# Patient Record
Sex: FEMALE | Race: BLACK OR AFRICAN AMERICAN | Employment: UNEMPLOYED | URBAN - METROPOLITAN AREA
[De-identification: names, ages, dates, MRNs, and addresses within clinical notes are randomized per-mention and may not be internally consistent; named-entity substitution may affect disease eponyms.]

---

## 2018-01-26 LAB
HBV SURFACE AG SERPL QL IA: NONREACTIVE
HIV 1+2 AB+HIV1 P24 AG SERPL QL IA: NONREACTIVE
RUBELLA ANTIBODY IGG QUANTITATIVE: NORMAL IU/ML
T PALLIDUM IGG SER QL: NON REACTIVE

## 2018-04-03 ENCOUNTER — HOSPITAL ENCOUNTER (EMERGENCY)
Facility: CLINIC | Age: 28
Discharge: HOME | End: 2018-04-03
Attending: EMERGENCY MEDICINE | Admitting: EMERGENCY MEDICINE
Payer: COMMERCIAL

## 2018-04-03 VITALS
BODY MASS INDEX: 22.18 KG/M2 | SYSTOLIC BLOOD PRESSURE: 93 MMHG | WEIGHT: 138 LBS | HEIGHT: 66 IN | HEART RATE: 93 BPM | DIASTOLIC BLOOD PRESSURE: 65 MMHG | RESPIRATION RATE: 18 BRPM | OXYGEN SATURATION: 100 % | TEMPERATURE: 97 F

## 2018-04-03 DIAGNOSIS — K52.9 GASTROENTERITIS: ICD-10-CM

## 2018-04-03 LAB
ALBUMIN UR-MCNC: NEGATIVE MG/DL
ANION GAP SERPL CALCULATED.3IONS-SCNC: 5 MMOL/L (ref 3–14)
APPEARANCE UR: ABNORMAL
BILIRUB UR QL STRIP: NEGATIVE
BUN SERPL-MCNC: 7 MG/DL (ref 7–30)
CALCIUM SERPL-MCNC: 8.1 MG/DL (ref 8.5–10.1)
CHLORIDE SERPL-SCNC: 105 MMOL/L (ref 94–109)
CO2 SERPL-SCNC: 25 MMOL/L (ref 20–32)
COLOR UR AUTO: ABNORMAL
CREAT SERPL-MCNC: 0.45 MG/DL (ref 0.52–1.04)
GFR SERPL CREATININE-BSD FRML MDRD: >90 ML/MIN/1.7M2
GLUCOSE SERPL-MCNC: 73 MG/DL (ref 70–99)
GLUCOSE UR STRIP-MCNC: NEGATIVE MG/DL
HGB UR QL STRIP: NEGATIVE
KETONES UR STRIP-MCNC: 20 MG/DL
LEUKOCYTE ESTERASE UR QL STRIP: NEGATIVE
MUCOUS THREADS #/AREA URNS LPF: PRESENT /LPF
NITRATE UR QL: NEGATIVE
PH UR STRIP: 7 PH (ref 5–7)
POTASSIUM SERPL-SCNC: 3.4 MMOL/L (ref 3.4–5.3)
RBC #/AREA URNS AUTO: <1 /HPF (ref 0–2)
SODIUM SERPL-SCNC: 135 MMOL/L (ref 133–144)
SOURCE: ABNORMAL
SP GR UR STRIP: 1.01 (ref 1–1.03)
UROBILINOGEN UR STRIP-MCNC: 0 MG/DL (ref 0–2)
WBC #/AREA URNS AUTO: 1 /HPF (ref 0–5)

## 2018-04-03 PROCEDURE — 81001 URINALYSIS AUTO W/SCOPE: CPT | Performed by: EMERGENCY MEDICINE

## 2018-04-03 PROCEDURE — 80048 BASIC METABOLIC PNL TOTAL CA: CPT | Performed by: EMERGENCY MEDICINE

## 2018-04-03 PROCEDURE — 99284 EMERGENCY DEPT VISIT MOD MDM: CPT | Mod: 25

## 2018-04-03 PROCEDURE — 25000128 H RX IP 250 OP 636: Performed by: EMERGENCY MEDICINE

## 2018-04-03 PROCEDURE — 96361 HYDRATE IV INFUSION ADD-ON: CPT

## 2018-04-03 PROCEDURE — 96374 THER/PROPH/DIAG INJ IV PUSH: CPT

## 2018-04-03 RX ORDER — METOCLOPRAMIDE HYDROCHLORIDE 5 MG/ML
5 INJECTION INTRAMUSCULAR; INTRAVENOUS ONCE
Status: COMPLETED | OUTPATIENT
Start: 2018-04-03 | End: 2018-04-03

## 2018-04-03 RX ORDER — METOCLOPRAMIDE 5 MG/1
5 TABLET ORAL 3 TIMES DAILY PRN
Qty: 10 TABLET | Refills: 0 | Status: ON HOLD | OUTPATIENT
Start: 2018-04-03 | End: 2018-06-16

## 2018-04-03 RX ADMIN — METOCLOPRAMIDE 5 MG: 5 INJECTION, SOLUTION INTRAMUSCULAR; INTRAVENOUS at 17:47

## 2018-04-03 RX ADMIN — SODIUM CHLORIDE, POTASSIUM CHLORIDE, SODIUM LACTATE AND CALCIUM CHLORIDE 1000 ML: 600; 310; 30; 20 INJECTION, SOLUTION INTRAVENOUS at 17:48

## 2018-04-03 ASSESSMENT — ENCOUNTER SYMPTOMS
DYSURIA: 0
DIZZINESS: 1
DIARRHEA: 1
ABDOMINAL PAIN: 0
VOMITING: 1
NAUSEA: 1

## 2018-04-03 NOTE — ED NOTES
Patient presents with complaints of nausea, vomiting, and diarrhea. Patient states that vomiting started yesterday and diarrhea started today. Patient is currently about 17 weeks pregnant. Patient states she is having slight cramping but denies any discharge, ABC intact without need for intervention.

## 2018-04-03 NOTE — ED AVS SNAPSHOT
[facility] Emergency Department    [address]    [facility] [address]    Phone:  [phone]    Fax:  [phone]                                       Carmen Rousseau   MRN: [id number]    Department:  [facility] Emergency Department   Date of Visit:  4/3/2018           After Visit Summary Signature Page     I have received my discharge instructions, and my questions have been answered. I have discussed any challenges I see with this plan with the nurse or doctor.    ..........................................................................................................................................  Patient/Patient Representative Signature      ..........................................................................................................................................  Patient Representative Print Name and Relationship to Patient    ..................................................               ................................................  Date                                            Time    ..........................................................................................................................................  Reviewed by Signature/Title    ...................................................              ..............................................  Date                                                            Time

## 2018-04-03 NOTE — ED AVS SNAPSHOT
[facility] Emergency Department    [address]    [facility] [address]    Phone:  [phone]    Fax:  [phone]                                       Carmen Rousseau   MRN: [id number]    Department:  [facility] Emergency Department   Date of Visit:  4/3/2018           Patient Information     Date Of Birth          1990        Your diagnoses for this visit were:     Gastroenteritis        You were seen by Helena So MD.      Follow-up Information     Follow up with Your OB/GYN. Schedule an appointment as soon as possible for a visit in 2 days.    Why:  As needed if symptoms persist        Follow up with [facility] Emergency Department.    Specialty:  EMERGENCY MEDICINE    Why:  As needed, If symptoms worsen    Contact information:    [address]  [facility] [id number] [phone]        Discharge Instructions       Discharge Instructions  Gastroenteritis    You have been seen today for vomiting (throwing up) and diarrhea (loose stools), called gastroenteritis or the stomach flu. This is usually caused by a virus, but some bacteria, parasites, medicines or other medical conditions can cause similar symptoms. At this time your provider does not find that your vomiting and diarrhea is a sign of anything dangerous or life-threatening.  However, sometimes the signs of serious illness do not show up right away. Remember that serious problems like appendicitis can look like gastroenteritis at first.       Generally, every Emergency Department visit should have a follow-up clinic visit with either a primary or a specialty clinic/provider. Please follow-up as instructed by your emergency provider today.    Return to the Emergency Department if:    You keep vomiting and you are not able to keep liquids down.    You feel you are getting dehydrated, such as being very thirsty, not urinating (peeing), or feeling faint or lightheaded.      You develop a new fever.    You have abdominal (belly) pain that seems worse than cramps, is in one spot, or is getting worse over time.     You have blood in your vomit or in your diarrhea.    You feel very weak.    What can I do to help myself?    The most important thing to do is to drink clear liquids.  If you have been vomiting a lot, it is best to have only small, frequent sips of liquids.  Drinking too much at once may cause more vomiting. Water is a good first option for rehydration. If you are vomiting often, you must also replace electrolytes (salts and minerals) lost with your illness. Pedialyte  is the best rehydration liquid but many don t like the taste so sports drinks (like Gatorade ) are a good option. Sodas and juice are also options but are high in sugar. Avoid acid liquids (orange), caffeine (coffee) or alcohol. Do not drink milk until you no longer have diarrhea.    After liquids are staying down, you may start eating mild foods. Soda crackers, toast, plain noodles, gelatin, applesauce and bananas are good first choices.  Avoid foods that have acid, are spicy, fatty or fibrous (such as meats, coarse grains, vegetables). You may start eating these foods again in about 3 days when you are better.    Sometimes treatment includes prescription medicine to prevent nausea (sick to your stomach) and vomiting and to prevent diarrhea. If your provider prescribes these for you, take them as directed.    Nonprescription medicine is available for the treatment of diarrhea and can be very effective.  If you use it, make sure you use the dose recommended on the package. Avoid Lomotil . Check with your healthcare provider before you use any medicine for diarrhea.    Do not take ibuprofen, or other nonsteroidal anti-inflammatory medicines without checking with your healthcare provider.  If you were given a prescription for medicine here today, be sure to read all of the information (including the package  insert) that comes with your prescription.  This will include important information about the medicine, its side effects, and any warnings that you need to know about.  The pharmacist who fills the prescription can provide more information and answer questions you may have about the medicine.  If you have questions or concerns that the pharmacist cannot address, please call or return to the Emergency Department.   Remember that you can always come back to the Emergency Department if you are not able to see your regular provider in the amount of time listed above, if you get any new symptoms, or if there is anything that worries you.      24 Hour Appointment Hotline       To make an appointment at any [facility], call [phone] ([phone]). If you don't have a family doctor or clinic, we will help you find one. [facility] are conveniently located to serve the needs of you and your family.             Review of your medicines      START taking        Dose / Directions Last dose taken    metoclopramide 5 MG tablet   Commonly known as:  REGLAN   Dose:  5 mg   Quantity:  10 tablet        Take 1 tablet (5 mg) by mouth 3 times daily as needed (Nausea or Vomiting)   Refills:  0          Our records show that you are taking the medicines listed below. If these are incorrect, please call your family doctor or clinic.        Dose / Directions Last dose taken    ibuprofen 800 MG tablet   Commonly known as:  ADVIL/MOTRIN   Dose:  800 mg   Quantity:  30 tablet        Take 1 tablet (800 mg) by mouth every 8 hours as needed for moderate pain   Refills:  0        NIFEdipine ER osmotic 30 MG 24 hr tablet   Commonly known as:  PROCARDIA XL   Dose:  30 mg   Quantity:  14 tablet        Take 1 tablet (30 mg) by mouth daily   Refills:  0                Prescriptions were sent or printed at these locations (1 Prescription)                   Other Prescriptions                Printed at Department/Unit printer (1 of 1)          metoclopramide (REGLAN) 5 MG tablet                Procedures and tests performed during your visit     Basic metabolic panel    Fetal heart rate    UA reflex to Microscopic and Culture      Orders Needing Specimen Collection     None      Pending Results     No orders found from 4/1/2018 to 4/4/2018.            Pending Culture Results     No orders found from 4/1/2018 to 4/4/2018.            Pending Results Instructions     If you had any lab results that were not finalized at the time of your Discharge, you can call the ED Lab Result RN at [phone]. You will be contacted by this team for any positive Lab results or changes in treatment. The nurses are available 7 days a week from 10A to 6:30P.  You can leave a message 24 hours per day and they will return your call.        Test Results From Your Hospital Stay        4/3/2018  6:12 PM      Component Results     Component Value Ref Range & Units Status    Sodium 135 133 - 144 mmol/L Final    Potassium 3.4 3.4 - 5.3 mmol/L Final    Chloride 105 94 - 109 mmol/L Final    Carbon Dioxide 25 20 - 32 mmol/L Final    Anion Gap 5 3 - 14 mmol/L Final    Glucose 73 70 - 99 mg/dL Final    Urea Nitrogen 7 7 - 30 mg/dL Final    Creatinine 0.45 (L) 0.52 - 1.04 mg/dL Final    GFR Estimate >90 >60 mL/min/1.7m2 Final    Non  GFR Calc    GFR Estimate If Black >90 >60 mL/min/1.7m2 Final    African American GFR Calc    Calcium 8.1 (L) 8.5 - 10.1 mg/dL Final         4/3/2018  7:20 PM      Component Results     Component Value Ref Range & Units Status    Color Urine Straw  Final    Appearance Urine Slightly Cloudy  Final    Glucose Urine Negative NEG^Negative mg/dL Final    Bilirubin Urine Negative NEG^Negative Final    Ketones Urine 20 (A) NEG^Negative mg/dL Final    Specific Gravity Urine 1.008 1.003 - 1.035 Final    Blood Urine Negative NEG^Negative Final    pH Urine 7.0 5.0 - 7.0 pH Final    Protein Albumin Urine Negative NEG^Negative mg/dL Final     Urobilinogen mg/dL 0.0 0.0 - 2.0 mg/dL Final    Nitrite Urine Negative NEG^Negative Final    Leukocyte Esterase Urine Negative NEG^Negative Final    Source Midstream Urine  Final    RBC Urine <1 0 - 2 /HPF Final    WBC Urine 1 0 - 5 /HPF Final    Mucous Urine Present (A) NEG^Negative /LPF Final                Clinical Quality Measure: Blood Pressure Screening     Your blood pressure was checked while you were in the emergency department today. The last reading we obtained was  BP: 93/65 . Please read the guidelines below about what these numbers mean and what you should do about them.  If your systolic blood pressure (the top number) is less than 120 and your diastolic blood pressure (the bottom number) is less than 80, then your blood pressure is normal. There is nothing more that you need to do about it.  If your systolic blood pressure (the top number) is 120-139 or your diastolic blood pressure (the bottom number) is 80-89, your blood pressure may be higher than it should be. You should have your blood pressure rechecked within a year by a primary care provider.  If your systolic blood pressure (the top number) is 140 or greater or your diastolic blood pressure (the bottom number) is 90 or greater, you may have high blood pressure. High blood pressure is treatable, but if left untreated over time it can put you at risk for heart attack, stroke, or kidney failure. You should have your blood pressure rechecked by a primary care provider within the next 4 weeks.  If your provider in the emergency department today gave you specific instructions to follow-up with your doctor or provider even sooner than that, you should follow that instruction and not wait for up to 4 weeks for your follow-up visit.        Thank you for choosing [facility]       Thank you for choosing [facility] for your care. Our goal is always to provide you with excellent care. Hearing back from our patients is one way we can continue to improve our  "services. Please take a few minutes to complete the written survey that you may receive in the mail after you visit with us. Thank you!        [facility] Information     [facility] lets you send messages to your doctor, view your test results, renew your prescriptions, schedule appointments and more. To sign up, go to [email or web address] . Click on \"Log in\" on the left side of the screen, which will take you to the Welcome page. Then click on \"Sign up Now\" on the right side of the page.     You will be asked to enter the access code listed below, as well as some personal information. Please follow the directions to create your username and password.     Your access code is: [email or web address]  Expires: 2018  8:12 PM     Your access code will  in 90 days. If you need help or a new code, please call your [facility] clinic or [phone].        Care EveryWhere ID     This is your Care EveryWhere ID. This could be used by other organizations to access your [facility] medical records  [id number]        Equal Access to Services     [facility] : Hadsameer Ryan, wajamie martin, qapam guzman, nael rodriguez . So [facility] [phone].    ATENCIÓN: Si habla español, tiene a mckeon disposición servicios gratuitos de asistencia lingüística. Llame al [phone].    We comply with applicable federal civil rights laws and [place] laws. We do not discriminate on the basis of race, color, national origin, age, disability, sex, sexual orientation, or gender identity.            After Visit Summary       This is your record. Keep this with you and show to your community pharmacist(s) and doctor(s) at your next visit.                  "

## 2018-04-03 NOTE — ED PROVIDER NOTES
"  History     Chief Complaint:  Nausea, Vomiting, & Diarrhea    HPI   Carmen Rousseau is a  17 week pregnant 28 year old female who presents to the emergency department today for evaluation of nausea, vomiting, and diarrhea.  The patient has had difficulty with nausea and vomiting throughout the course of her pregnancy and yesterday, ate tuna and eggs.  She then reports vomiting since last night, and this morning, began vomiting \"green material\" even if she tries to drink sips of water.  She has Zofran at home, but did not use it as it hasn't helped in the past.  She also reports diarrhea since this morning and here, states that she feels somewhat dizzy and dehydrated.  She denies any abdominal pain, dysuria, vaginal bleeding, vaginal discharge, or recent antibiotic use.  She denies there being anyone sick at home, nor did anyone share the meal yesterday.    Allergies:  No Known Drug Allergies    Medications:    The patient is currently on no regular medications.      Past Medical History:    History reviewed. No pertinent past medical history.    Past Surgical History:     section x2  Dental surgery    Family History:    History reviewed. No pertinent family history.     Social History:  The patient presented to the ED alone.  Works at [employer].  Smoking Status: Never smoker  Smokeless Tobacco: Never used  Alcohol Use: No  Marital Status:   [2]    Review of Systems   Gastrointestinal: Positive for diarrhea, nausea and vomiting. Negative for abdominal pain.   Genitourinary: Negative for dysuria, vaginal bleeding and vaginal discharge.   Neurological: Positive for dizziness.   All other systems reviewed and are negative.    Physical Exam     Patient Vitals for the past 24 hrs:   BP Temp Pulse Heart Rate Resp SpO2 Height Weight   18 1845 93/65 - - - - 100 % - -   18 1830 100/69 - - - - 100 % - -   18 1815 96/64 - - - - 100 % - -   18 1800 90/60 - - - - 99 % - -   18 " "1745 100/63 - - - - 99 % - -   04/03/18 1730 104/68 - - - - 99 % - -   04/03/18 1715 103/66 - - - - 99 % - -   04/03/18 1646 109/66 97  F (36.1  C) 93 93 18 100 % 1.676 m (5' 6\") 62.6 kg (138 lb)     Physical Exam  Constitutional:  Well developed, Well nourished, Completely comfortable appearing.  HENT:  Bilateral external ears normal, Mucous membranes moist, Dry lips. Nose normal. Neck- Normal range of motion, Supple  Respiratory:  Normal breath sounds, No respiratory distress, No wheezing,  Cardiovascular:  Normal heart rate, Normal rhythm, No murmurs,    GI:  Bowel sounds normal, Soft, Nondistended, Minimal tenderness across the lower abdomen, no rebound or guarding  Musculoskeletal:  Intact distal pulses, No edema, grossly unremarkable range of motion   Integument:  Warm, Dry   Neurologic:  Alert, attentive and appropriately oriented  Psychiatric:  Mood and affect normal.     Emergency Department Course     Laboratory:  Laboratory findings were communicated with the patient who voiced understanding of the findings.    BMP: Creatinine 0.45 (L), Calcium 8.1 (L), o/w WNL  UA with micro: Ketones 20 (A), Mucous present (A) o/w negative    Interventions:  1747 Reglan 5 mg IV  1748 LR 1 L IV    Emergency Department Course:  Nursing notes and vitals reviewed.  1716: I performed an exam of the patient as documented above.   IV was inserted and blood was drawn for laboratory testing, results above.  The patient provided a urine sample here in the emergency department. This was sent for laboratory testing, findings above.  1832: I rechecked the patient and updated her on the results of laboratory studies.  She was feeling improved, though nausea was still not completely resolved.  2002: I rechecked the patient and she was feeling improved, has tolerated p.o. without difficulty, no dizziness.  No abdominal tenderness on exam.  She declines additional antiemetics for home.  I discussed the treatment plan with the patient.  " She expressed understanding of this plan and consented to discharge.  She will be discharged home with instructions for care and follow up.  In addition, the patient will return to the emergency department if her symptoms worsen, if new symptoms arise or if there is any concern.  All questions were answered prior to discharge.    Impression & Plan      Medical Decision Making:  Carmen Rousseau is a 28 year old female who presents for evaluation of nausea, vomiting and diarrhea without abdominal pain in a nonfocal abdominal exam.  There are no ill contacts.  I considered a broad differential diagnosis for this patient including viral gastroenteritis, bacterial infection of the large intestine (salmonella, shigella, campylobacter, e coli, etc), bowel obstruction, intra-abdominal infection such as colitis, food poisoning, cholecystitis, UTI, pyelonephritis, appendicitis, etc.  There are no signs of worrisome intra-abdominal pathologies detected during the visit today.  She has no abdominal bleeding, discharge, fetal heart tones are unremarkable; this does not appear related to pregnancy.  She has a completely benign abdominal exam without rebound, guarding, or marked tenderness to palpation.  Supportive outpatient management is therefore indicated.  Abdominal pain precautions are given for home.  No indication for stool studies at this time.  No indication for imaging at this time.  She passed oral challenge here in ED.  It was discussed to return to the ED for blood in stool, increasing pain, or fevers more than 102.  Feels much improved after interventions in ED.     Diagnosis:    ICD-10-CM    1. Gastroenteritis K52.9 UA reflex to Microscopic and Culture     Disposition:   Home    Discharge Medications:  Discharge Medication List as of 4/3/2018  8:13 PM      START taking these medications    Details   metoclopramide (REGLAN) 5 MG tablet Take 1 tablet (5 mg) by mouth 3 times daily as needed (Nausea or Vomiting),  Disp-10 tablet, R-0, Local Print           Scribe Disclosure:  I, Linda Lagos, am serving as a scribe at 5:16 PM on 4/3/2018 to document services personally performed by Helena So MD, based on my observations and the provider's statements to me.    4/3/2018   [facility] EMERGENCY DEPARTMENT       Helena So MD  04/04/18 0054

## 2018-05-04 ENCOUNTER — HOSPITAL ENCOUNTER (OUTPATIENT)
Facility: CLINIC | Age: 28
LOS: 1 days | Discharge: HOME | End: 2018-05-04
Attending: OBSTETRICS & GYNECOLOGY | Admitting: OBSTETRICS & GYNECOLOGY
Payer: COMMERCIAL

## 2018-05-04 VITALS
BODY MASS INDEX: 22.18 KG/M2 | HEART RATE: 72 BPM | HEIGHT: 66 IN | TEMPERATURE: 97.9 F | RESPIRATION RATE: 18 BRPM | WEIGHT: 138 LBS | SYSTOLIC BLOOD PRESSURE: 109 MMHG | DIASTOLIC BLOOD PRESSURE: 69 MMHG

## 2018-05-04 LAB
ALBUMIN UR-MCNC: NEGATIVE MG/DL
ANION GAP SERPL CALCULATED.3IONS-SCNC: 9 MMOL/L (ref 3–14)
APPEARANCE UR: CLEAR
BACTERIA #/AREA URNS HPF: ABNORMAL /HPF
BILIRUB UR QL STRIP: NEGATIVE
BUN SERPL-MCNC: 5 MG/DL (ref 7–30)
CALCIUM SERPL-MCNC: 8.2 MG/DL (ref 8.5–10.1)
CHLORIDE SERPL-SCNC: 105 MMOL/L (ref 94–109)
CO2 SERPL-SCNC: 22 MMOL/L (ref 20–32)
COLOR UR AUTO: ABNORMAL
CREAT SERPL-MCNC: 0.56 MG/DL (ref 0.52–1.04)
ERYTHROCYTE [DISTWIDTH] IN BLOOD BY AUTOMATED COUNT: 13.5 % (ref 10–15)
GFR SERPL CREATININE-BSD FRML MDRD: >90 ML/MIN/1.7M2
GLUCOSE SERPL-MCNC: 78 MG/DL (ref 70–99)
GLUCOSE UR STRIP-MCNC: NEGATIVE MG/DL
HCT VFR BLD AUTO: 34.4 % (ref 35–47)
HGB BLD-MCNC: 11.5 G/DL (ref 11.7–15.7)
HGB UR QL STRIP: NEGATIVE
KETONES UR STRIP-MCNC: NEGATIVE MG/DL
LEUKOCYTE ESTERASE UR QL STRIP: NEGATIVE
MCH RBC QN AUTO: 30.1 PG (ref 26.5–33)
MCHC RBC AUTO-ENTMCNC: 33.4 G/DL (ref 31.5–36.5)
MCV RBC AUTO: 90 FL (ref 78–100)
MUCOUS THREADS #/AREA URNS LPF: PRESENT /LPF
NITRATE UR QL: NEGATIVE
PH UR STRIP: 7 PH (ref 5–7)
PLATELET # BLD AUTO: 193 10E9/L (ref 150–450)
POTASSIUM SERPL-SCNC: 3.6 MMOL/L (ref 3.4–5.3)
RBC # BLD AUTO: 3.82 10E12/L (ref 3.8–5.2)
RBC #/AREA URNS AUTO: <1 /HPF (ref 0–2)
SODIUM SERPL-SCNC: 136 MMOL/L (ref 133–144)
SOURCE: ABNORMAL
SP GR UR STRIP: 1 (ref 1–1.03)
SQUAMOUS #/AREA URNS AUTO: <1 /HPF (ref 0–1)
UROBILINOGEN UR STRIP-MCNC: 0 MG/DL (ref 0–2)
WBC # BLD AUTO: 6.4 10E9/L (ref 4–11)
WBC #/AREA URNS AUTO: 1 /HPF (ref 0–5)

## 2018-05-04 PROCEDURE — 25000132 ZZH RX MED GY IP 250 OP 250 PS 637: Performed by: OBSTETRICS & GYNECOLOGY

## 2018-05-04 PROCEDURE — G0463 HOSPITAL OUTPT CLINIC VISIT: HCPCS | Mod: 25

## 2018-05-04 PROCEDURE — 80048 BASIC METABOLIC PNL TOTAL CA: CPT | Performed by: OBSTETRICS & GYNECOLOGY

## 2018-05-04 PROCEDURE — 81001 URINALYSIS AUTO W/SCOPE: CPT | Performed by: OBSTETRICS & GYNECOLOGY

## 2018-05-04 PROCEDURE — 36415 COLL VENOUS BLD VENIPUNCTURE: CPT | Performed by: OBSTETRICS & GYNECOLOGY

## 2018-05-04 PROCEDURE — 85027 COMPLETE CBC AUTOMATED: CPT | Performed by: OBSTETRICS & GYNECOLOGY

## 2018-05-04 RX ORDER — ACETAMINOPHEN 325 MG/1
650 TABLET ORAL EVERY 4 HOURS PRN
Status: DISCONTINUED | OUTPATIENT
Start: 2018-05-04 | End: 2018-05-05 | Stop reason: HOSPADM

## 2018-05-04 RX ORDER — LORATADINE 10 MG/1
10 TABLET ORAL DAILY
Status: DISCONTINUED | OUTPATIENT
Start: 2018-05-04 | End: 2018-05-04

## 2018-05-04 RX ORDER — PRENATAL VIT/IRON FUM/FOLIC AC 27MG-0.8MG
1 TABLET ORAL DAILY
COMMUNITY

## 2018-05-04 RX ORDER — LORATADINE 10 MG/1
10 TABLET ORAL DAILY
Status: DISCONTINUED | OUTPATIENT
Start: 2018-05-05 | End: 2018-05-04

## 2018-05-04 RX ORDER — LORATADINE 10 MG/1
10 TABLET ORAL ONCE
Status: COMPLETED | OUTPATIENT
Start: 2018-05-04 | End: 2018-05-04

## 2018-05-04 RX ORDER — ONDANSETRON 2 MG/ML
4 INJECTION INTRAMUSCULAR; INTRAVENOUS EVERY 6 HOURS PRN
Status: DISCONTINUED | OUTPATIENT
Start: 2018-05-04 | End: 2018-05-05 | Stop reason: HOSPADM

## 2018-05-04 RX ADMIN — LORATADINE 10 MG: 10 TABLET ORAL at 22:01

## 2018-05-04 RX ADMIN — ACETAMINOPHEN 650 MG: 325 TABLET, FILM COATED ORAL at 21:32

## 2018-05-04 NOTE — IP AVS SNAPSHOT
MRN:[id number]                      After Visit Summary   5/4/2018    Carmen Rousseau    MRN: [id number]           Thank you!     Thank you for choosing [facility] for your care. Our goal is always to provide you with excellent care. Hearing back from our patients is one way we can continue to improve our services. Please take a few minutes to complete the written survey that you may receive in the mail after you visit. If you would like to speak to someone directly about your visit please contact Patient Relations at [phone]. Thank you!          Patient Information     Date Of Birth          1990        About your hospital stay     You were admitted on:  May 4, 2018 You last received care in the:  [facility] Labor and Delivery    You were discharged on:  May 4, 2018       Who to Call     For medical emergencies, please call 911.  For non-urgent questions about your medical care, please call your primary care provider or clinic, [phone]          Attending Provider     Provider Specialty    Roopa Torres MD OB/Gyn       Primary Care Provider Office Phone # Fax #    [facility] [phone]      Your next 10 appointments already scheduled     Aug 10, 2018   Procedure with Kayla Merrill, DO   [facility] Labor and Delivery (--)    [address] [facility] [address] [phone]              Further instructions from your care team       Discharge Instruction for Undelivered Patients      You were seen for: Abdominal pain and feeling like you were going to black out.  We Consulted: Dr. Le  You had (Test or Medicine):fetal monitoring, SVE, UA, Labs and tylenol and claritin.     Diet:   Drink 8 to 12 glasses of liquids (milk, juice, water) every day.  You may eat meals and snacks.     Activity:  Call your doctor or nurse midwife if your baby is moving less than usual.     Call your provider if you  "notice:  Swelling in your face or increased swelling in your hands or legs.  Headaches that are not relieved by Tylenol (acetaminophen).  Changes in your vision (blurring: seeing spots or stars.)  Nausea (sick to your stomach) and vomiting (throwing up).   Weight gain of 5 pounds or more per week.  Heartburn that doesn't go away.  Signs of bladder infection: pain when you urinate (use the toilet), need to go more often and more urgently.  The bag of arroyo (rupture of membranes) breaks, or you notice leaking in your underwear.  Bright red blood in your underwear.  Abdominal (lower belly) or stomach pain.  Second (plus) baby: Contractions (tightening) less than 10 minutes apart and getting stronger.  *If less than 34 weeks: Contractions (tightenings) more than 6 times in one hour.  Increase or change in vaginal discharge (note the color and amount)      Follow-up:  As scheduled in the clinic          Pending Results     No orders found from 5/2/2018 to 5/5/2018.            Admission Information     Date & Time Provider Department Dept. Phone    5/4/2018 Roopa Torres MD [facility] Labor and Delivery [phone]      Your Vitals Were     Blood Pressure Pulse Temperature Respirations Height Weight    109/69 72 97.9  F (36.6  C) (Oral) 18 1.676 m (5' 6\") 62.6 kg (138 lb)    BMI (Body Mass Index)                   22.27 kg/m2           MyChart Information     [employer] lets you send messages to your doctor, view your test results, renew your prescriptions, schedule appointments and more. To sign up, go to [email or web address] . Click on \"Log in\" on the left side of the screen, which will take you to the Welcome page. Then click on \"Sign up Now\" on the right side of the page.     You will be asked to enter the access code listed below, as well as some personal information. Please follow the directions to create your username and password.     Your access code is: [email or web address]  Expires: 7/2/2018  8:12 PM   "   Your access code will  in 90 days. If you need help or a new code, please call your [place] clinic or [phone].        Care EveryWhere ID     This is your Care EveryWhere ID. This could be used by other organizations to access your [place] medical records  [id number]        Equal Access to Services     AFSHAN FABIAN : Hadii joel ku hadasho Soomaali, waaxda luqadaha, qaybta kaalmada adegennaroyada, nael changlaytonagustin lucio. So [facility] [phone].    ATENCIÓN: Si habla español, tiene a mckeon disposición servicios gratuitos de asistencia lingüística. Llame al [phone].    We comply with applicable federal civil rights laws and [place] laws. We do not discriminate on the basis of race, color, national origin, age, disability, sex, sexual orientation, or gender identity.               Review of your medicines      UNREVIEWED medicines. Ask your doctor about these medicines        Dose / Directions    metoclopramide 5 MG tablet   Commonly known as:  REGLAN        Dose:  5 mg   Take 1 tablet (5 mg) by mouth 3 times daily as needed (Nausea or Vomiting)   Quantity:  10 tablet   Refills:  0       prenatal multivitamin plus iron 27-0.8 MG Tabs per tablet        Dose:  1 tablet   Take 1 tablet by mouth daily   Refills:  0                Protect others around you: Learn how to safely use, store and throw away your medicines at www.disposemymeds.org.             Medication List: This is a list of all your medications and when to take them. Check marks below indicate your daily home schedule. Keep this list as a reference.      Medications           Morning Afternoon Evening Bedtime As Needed    metoclopramide 5 MG tablet   Commonly known as:  REGLAN   Take 1 tablet (5 mg) by mouth 3 times daily as needed (Nausea or Vomiting)                                prenatal multivitamin plus iron 27-0.8 MG Tabs per tablet   Take 1 tablet by mouth daily

## 2018-05-04 NOTE — IP AVS SNAPSHOT
[facility] Labor and Delivery    [address]    [facility] [address]    Phone:  [phone]    Fax:  [phone]                                       After Visit Summary   5/4/2018    Carmen Rousseau    MRN: [id number]           After Visit Summary Signature Page     I have received my discharge instructions, and my questions have been answered. I have discussed any challenges I see with this plan with the nurse or doctor.    ..........................................................................................................................................  Patient/Patient Representative Signature      ..........................................................................................................................................  Patient Representative Print Name and Relationship to Patient    ..................................................               ................................................  Date                                            Time    ..........................................................................................................................................  Reviewed by Signature/Title    ...................................................              ..............................................  Date                                                            Time

## 2018-05-05 NOTE — DISCHARGE INSTRUCTIONS
Discharge Instruction for Undelivered Patients      You were seen for: Abdominal pain and feeling like you were going to black out.  We Consulted: Dr. Le  You had (Test or Medicine):fetal monitoring, SVE, UA, Labs and tylenol and claritin.     Diet:   Drink 8 to 12 glasses of liquids (milk, juice, water) every day.  You may eat meals and snacks.     Activity:  Call your doctor or nurse midwife if your baby is moving less than usual.     Call your provider if you notice:  Swelling in your face or increased swelling in your hands or legs.  Headaches that are not relieved by Tylenol (acetaminophen).  Changes in your vision (blurring: seeing spots or stars.)  Nausea (sick to your stomach) and vomiting (throwing up).   Weight gain of 5 pounds or more per week.  Heartburn that doesn't go away.  Signs of bladder infection: pain when you urinate (use the toilet), need to go more often and more urgently.  The bag of arroyo (rupture of membranes) breaks, or you notice leaking in your underwear.  Bright red blood in your underwear.  Abdominal (lower belly) or stomach pain.  Second (plus) baby: Contractions (tightening) less than 10 minutes apart and getting stronger.  *If less than 34 weeks: Contractions (tightenings) more than 6 times in one hour.  Increase or change in vaginal discharge (note the color and amount)      Follow-up:  As scheduled in the clinic

## 2018-05-05 NOTE — PROVIDER NOTIFICATION
18 2100   Provider Notification   Provider Name/Title    Method of Notification Phone   Request Evaluate - Remote   Notification Reason Patient Arrived    here at 22w6d for low abdominal pain and feeliing like pt may black out at times.  EFM applied x2 and explained. Urine was collected and health history obtained.  MD notifed and orders received for a UA, CBC,BMP and a glucose and notify MD with results.

## 2018-05-05 NOTE — PROVIDER NOTIFICATION
05/04/18 2130   Provider Notification   Provider Name/Title Dr. Le   Method of Notification At Bedside   Request Evaluate in Person   Notification Reason Status Update   Md at bedside to assess pt.  SVE was done by MD and cervix was closed, thick and high. Orders received to give pt some tylenol and claritin and will notify MD with lab results.

## 2018-05-05 NOTE — PLAN OF CARE
Data: Patient assessed in the [facility] for abdominal pain, and feeling like blacking out..  Cervical exam closed, long and thick.  Membranes intact.  Contractions very rare but not feeling them..  Action:  Presumed adequate fetal oxygenation documented (see flow record). Discharge instructions reviewed.  Patient instructed to report change in fetal movement, vaginal leaking of fluid or bleeding, abdominal pain, or any concerns related to the pregnancy to her nurse/physician.    Response: Orders to discharge home per Roopa Torres.  Patient verbalized understanding of education and verbalized agreement with plan. Discharged to home at 2230.

## 2018-05-05 NOTE — PROVIDER NOTIFICATION
05/04/18 2220   Provider Notification   Provider Name/Title Dr. Le   Method of Notification Phone   Request Evaluate - Remote   Notification Reason Lab/Diagnostic Study   Lab results WNL, pt starting to feel a little better. Orders received to discharge to home.

## 2018-05-05 NOTE — PROGRESS NOTES
"[facility] [place]AGE    Carmen Rousseau is a 28 year old  at 22 6/7 weeks gestation  who presents to labor and delivery for headache, RLQ pressure/pain in pelvis, stuffiness, and feeling like she may pass out intermittently.  Has not passed out. Has had headache on and off for today.  Usually takes tylenol, 500 mg, hasn't yet.  No nausea/emesis.    US yesterday showed good cervical length (reviewed in chart).  Says R low pelvic pressure \"like pushing\" is new since then.  No bleeding.  Good FM.    Pregnancy complicated by hx c/s x 3, anand for PROM, hx pre-eclampsia.  Plans repeat c/s 36-37 weeks for hx classical incision.  Says taking progesterone vaginal suppository 200 mg qhs.  Says takes baby ASA \"sometimes\", recommended to decrease risk pre-eclampsia. I told her this may only work if taking daily since 20 weeks, encouraged compliance.    Hasn't had dinner, will offer sandwich.    Works difficult night shift and then not able to sleep well.  Starting Monday will work noon-8, and thinks this will be much better.   from ,her mom and family are helping with the kids.    Patient Active Problem List   Diagnosis     History of  premature rupture of membranes (PROM) in previous pregnancy, currently pregnant     Hx of preeclampsia, prior pregnancy, currently pregnant     History of  section, classical     Indication for care in labor and delivery, antepartum     S/P  section       Current Facility-Administered Medications   Medication     acetaminophen (TYLENOL) tablet 650 mg     [START ON 2018] loratadine (CLARITIN) tablet 10 mg     NO Rho (D) immune globulin (RhoGam) needed - mother Rh POSITIVE     ondansetron (ZOFRAN) injection 4 mg       Allergies:  nkda      Obstetric History       T0      L2     SAB0   TAB0   Ectopic0   Multiple0   Live Births3       # Outcome Date GA Lbr Jhonny/2nd Weight Sex Delivery Anes PTL Lv   4 Current            3  03/26/15 36w2d  " "2.5 kg (5 lb 8.2 oz) F CS-LTranv Spinal  TIM      Apgar1:  7                Apgar5: 7   2  12 27w0d    CS-Classical  Y ND      Complications:  premature rupture of membranes, labor,Footling breech presentation   1  11 36w4d  1.945 kg (4 lb 4.6 oz) F Vag-Spont   TIM      Complications: Severe pre-eclampsia, with delivery          History reviewed. No pertinent past medical history.    Past Surgical History:   Procedure Laterality Date      SECTION  classical c/section      SECTION N/A 3/26/2015    Procedure:  SECTION;  Surgeon: Kayla Merrill DO;  Location:  L+D     DENTAL SURGERY         ROS: as above.  Would normally take 500 mg tylenol for this headache.    OBJECTIVE:  Blood pressure 109/69, pulse 72, temperature 97.9  F (36.6  C), temperature source Oral, resp. rate 18, height 1.676 m (5' 6\"), weight 62.6 kg (138 lb), unknown if currently breastfeeding.    WDWN gravid woman in NAD    Abd gravid, nontender.    SVE: long, closed, firm.    [place]:no ctx  FHT 140s with accels already.    Color Urine (no units)   Date Value   2018 Straw     Appearance Urine (no units)   Date Value   2018 Clear     Glucose Urine (mg/dL)   Date Value   2018 Negative     Bilirubin Urine (no units)   Date Value   2018 Negative     Ketones Urine (mg/dL)   Date Value   2018 Negative     Specific Gravity Urine (no units)   Date Value   2018 1.003     pH Urine (pH)   Date Value   2018 7.0     Protein Albumin Urine (mg/dL)   Date Value   2018 Negative     Nitrite Urine (no units)   Date Value   2018 Negative     Leukocyte Esterase Urine (no units)   Date Value   2018 Negative           ASSESSMENT:  28 year old yo  at 22 6/7 weeks with hx  PROM, and classical c/s. Remote hx pre-eclampsia.  Vitals today reassuring.  Headache usually relieved with tylenol, hasn';t tried yet.  Stuffiness, possible " allergies.    PLAN:  Will check CBC and BMP, glucose due to above sx.  Will find food for her.  Claritin for allergy sx.  tyl for headache.  If labs ok and VSS, ok to DC home.  Sx should improve with better sleep and stabilization of blood volume/expansion which usually levels off at this point in pregnancy.  Her new work schedule should help.    Encouraged compliance with daily progesterone supp and baby ASA./  Explained why.    Keep prenatal appts as recommended.    Routine intrapartum care.    Roopa Torres MD  May 4, 2018

## 2018-06-16 ENCOUNTER — HOSPITAL ENCOUNTER (OUTPATIENT)
Facility: CLINIC | Age: 28
Discharge: HOME | End: 2018-06-16
Attending: OBSTETRICS & GYNECOLOGY | Admitting: OBSTETRICS & GYNECOLOGY
Payer: COMMERCIAL

## 2018-06-16 VITALS
DIASTOLIC BLOOD PRESSURE: 61 MMHG | BODY MASS INDEX: 22.29 KG/M2 | SYSTOLIC BLOOD PRESSURE: 102 MMHG | RESPIRATION RATE: 16 BRPM | HEIGHT: 67 IN | WEIGHT: 142 LBS | TEMPERATURE: 98.5 F | HEART RATE: 86 BPM

## 2018-06-16 DIAGNOSIS — O26.893 ABDOMINAL PAIN DURING PREGNANCY IN THIRD TRIMESTER: Primary | ICD-10-CM

## 2018-06-16 DIAGNOSIS — R10.9 ABDOMINAL PAIN DURING PREGNANCY IN THIRD TRIMESTER: Primary | ICD-10-CM

## 2018-06-16 PROBLEM — Z36.89 ENCOUNTER FOR TRIAGE IN PREGNANT PATIENT: Status: ACTIVE | Noted: 2018-06-16

## 2018-06-16 LAB
ALBUMIN UR-MCNC: NEGATIVE MG/DL
APPEARANCE UR: CLEAR
BILIRUB UR QL STRIP: NEGATIVE
COLOR UR AUTO: YELLOW
GLUCOSE UR STRIP-MCNC: NEGATIVE MG/DL
HGB UR QL STRIP: NEGATIVE
KETONES UR STRIP-MCNC: NEGATIVE MG/DL
LEUKOCYTE ESTERASE UR QL STRIP: NEGATIVE
MUCOUS THREADS #/AREA URNS LPF: PRESENT /LPF
NITRATE UR QL: NEGATIVE
PH UR STRIP: 6 PH (ref 5–7)
RBC #/AREA URNS AUTO: 0 /HPF (ref 0–2)
SOURCE: ABNORMAL
SP GR UR STRIP: 1.02 (ref 1–1.03)
SQUAMOUS #/AREA URNS AUTO: <1 /HPF (ref 0–1)
UROBILINOGEN UR STRIP-MCNC: 0 MG/DL (ref 0–2)
WBC #/AREA URNS AUTO: 1 /HPF (ref 0–5)

## 2018-06-16 PROCEDURE — G0463 HOSPITAL OUTPT CLINIC VISIT: HCPCS

## 2018-06-16 PROCEDURE — 81001 URINALYSIS AUTO W/SCOPE: CPT | Performed by: OBSTETRICS & GYNECOLOGY

## 2018-06-16 RX ORDER — CYCLOBENZAPRINE HCL 5 MG
5-10 TABLET ORAL EVERY 8 HOURS PRN
Qty: 30 TABLET | Refills: 0 | Status: SHIPPED | OUTPATIENT
Start: 2018-06-16 | End: 2018-08-09

## 2018-06-16 NOTE — PROVIDER NOTIFICATION
06/16/18 6389   Provider Notification   Provider Name/Title Dr. Kumar   Method of Notification Phone   Provider notified of pressure & tenderness in lower abdomen, shooting pain down right leg, urinary urgency and frequency, UA normal. Okay to discharge patient to home, provider will send Rx for muscle relaxer to patient's preferred pharmacy.

## 2018-06-16 NOTE — IP AVS SNAPSHOT
MRN:[id number]                      After Visit Summary   6/16/2018    Carmen Rousseau    MRN: [id number]           Thank you!     Thank you for choosing [facility] for your care. Our goal is always to provide you with excellent care. Hearing back from our patients is one way we can continue to improve our services. Please take a few minutes to complete the written survey that you may receive in the mail after you visit. If you would like to speak to someone directly about your visit please contact Patient Relations at [phone]. Thank you!          Patient Information     Date Of Birth          1990        About your hospital stay     You were admitted on:  June 16, 2018 You last received care in the:  [facility] Labor and Delivery    You were discharged on:  June 16, 2018       Who to Call     For medical emergencies, please call 911.  For non-urgent questions about your medical care, please call your primary care provider or clinic, [phone]          Attending Provider     Provider Maia Golden DO OB/Gyn       Primary Care Provider Office Phone # Fax #    [facility] [phone]      Your next 10 appointments already scheduled     Aug 10, 2018   Procedure with Kayla Merrill DO   [facility] Labor and Delivery (--)    [address] [facility] [address] [phone]              Further instructions from your care team       Discharge Instruction for Undelivered Patients      You were seen for: Labor Assessment and abdominal pain  We Consulted: Dr. Hall  You had (Test or Medicine): urinalysis     Diet:   Drink 8 to 12 glasses of liquids (milk, juice, water) every day.  You may eat meals and snacks.     Activity:  Call your doctor or nurse midwife if your baby is moving less than usual.     Call your provider if you notice:  Swelling in your face or increased swelling in your hands or  "legs.  Headaches that are not relieved by Tylenol (acetaminophen).  Changes in your vision (blurring: seeing spots or stars.)  Nausea (sick to your stomach) and vomiting (throwing up).   Weight gain of 5 pounds or more per week.  Heartburn that doesn't go away.  Signs of bladder infection: pain when you urinate (use the toilet), need to go more often and more urgently.  The bag of arroyo (rupture of membranes) breaks, or you notice leaking in your underwear.  Bright red blood in your underwear.  Abdominal (lower belly) or stomach pain.  For first baby: Contractions (tightening) less than 5 minutes apart for one hour or more.  Second (plus) baby: Contractions (tightening) less than 10 minutes apart and getting stronger.  *If less than 34 weeks: Contractions (tightenings) more than 6 times in one hour.  Increase or change in vaginal discharge (note the color and amount)  Other: Your doctor is prescribing a muscle relaxant. This medication may make you drowsy. It is not safe to drive while using this medication.    Follow-up:  As scheduled in the clinic. If the medication we give you does not help, call the clinic's nurse line tonight or the clinic during business hours.           Pending Results     No orders found from 6/14/2018 to 6/17/2018.            Admission Information     Date & Time Provider Department Dept. Phone    6/16/2018 Maia Hall,  [facility] Labor and Delivery [phone]      Your Vitals Were     Blood Pressure Pulse Temperature Respirations Height Weight    102/61 86 98.5  F (36.9  C) (Oral) 16 1.702 m (5' 7\") 64.4 kg (142 lb)    BMI (Body Mass Index)                   22.24 kg/m2           [employer] Information     [employer] lets you send messages to your doctor, view your test results, renew your prescriptions, schedule appointments and more. To sign up, go to [email or web address] . Click on \"Log in\" on the left side of the screen, which will take you to the Welcome page. Then " "click on \"Sign up Now\" on the right side of the page.     You will be asked to enter the access code listed below, as well as some personal information. Please follow the directions to create your username and password.     Your access code is: [email or web address]  Expires: 2018  8:12 PM     Your access code will  in 90 days. If you need help or a new code, please call your [place] clinic or [phone].        Care EveryWhere ID     This is your Care EveryWhere ID. This could be used by other organizations to access your [place] medical records  [id number]        Equal Access to Services     [facility]: Hadsameer Ryan, nestor martin, pietro guzman, nael rodriguez . So [facility] [phone].    ATENCIÓN: Si habla español, tiene a mckeon disposición servicios gratuitos de asistencia lingüística. Llame al [phone].    We comply with applicable federal civil rights laws and [place] laws. We do not discriminate on the basis of race, color, national origin, age, disability, sex, sexual orientation, or gender identity.               Review of your medicines      START taking        Dose / Directions    cyclobenzaprine 5 MG tablet   Commonly known as:  FLEXERIL   Used for:  Abdominal pain during pregnancy in third trimester        Dose:  5-10 mg   Take 1-2 tablets (5-10 mg) by mouth every 8 hours as needed for muscle spasms or other (back and abdominal pain)   Quantity:  30 tablet   Refills:  0         CONTINUE these medicines which have NOT CHANGED        Dose / Directions    ASPIRIN PO        Dose:  81 mg   Take 81 mg by mouth daily   Refills:  0       prenatal multivitamin plus iron 27-0.8 MG Tabs per tablet        Dose:  1 tablet   Take 1 tablet by mouth daily   Refills:  0            Where to get your medicines      These medications were sent to [facility] [address] [place] [address] [address] AT [place] [address] [address] [facility] [address]     Phone:  [phone]     cyclobenzaprine 5 MG tablet                Protect others around you: Learn how to safely use, store and throw away your medicines at www.disposemymeds.org.             Medication List: This is a list of all your medications and when to take them. Check marks below indicate your daily home schedule. Keep this list as a reference.      Medications           Morning Afternoon Evening Bedtime As Needed    ASPIRIN PO   Take 81 mg by mouth daily                                cyclobenzaprine 5 MG tablet   Commonly known as:  FLEXERIL   Take 1-2 tablets (5-10 mg) by mouth every 8 hours as needed for muscle spasms or other (back and abdominal pain)                                prenatal multivitamin plus iron 27-0.8 MG Tabs per tablet   Take 1 tablet by mouth daily

## 2018-06-16 NOTE — PLAN OF CARE
Problem: Patient Care Overview  Goal: Plan of Care/Patient Progress Review  Data: Patient presented to [facility]: 2018  4:37 PM.  Reason for maternal/fetal assessment is abdominal pain. Patient reports contractions coming and going for last 3 days, pressure and tenderness in lower abdomen rated 7-8 out of 10, shooting pain down right leg.  Severe right CVA tenderness radiating down right leg noted. Patient also reports urinary urgency and frequency. Patient is a .  Prenatal record reviewed. Pregnancy  has been complicated by has been uncomplicated.   Gestational Age 29w0d. VSS. Fetal movement present. Patient denies leaking of vaginal fluid/rupture of membranes, vaginal bleeding, nausea, vomiting. Support person is not present.   Action: Verbal consent for EFM. Triage assessment completed. UA sent. Bill of rights reviewed.  Response: Patient verbalized agreement with plan. Will contact Dr Maia Hall with update and further orders.

## 2018-06-16 NOTE — IP AVS SNAPSHOT
[facility]    [address]    [facility] [address]    Phone:  [phone]    Fax:  [phone]                                       After Visit Summary   6/16/2018    Carmen Rousseau    MRN: [id number]           After Visit Summary Signature Page     I have received my discharge instructions, and my questions have been answered. I have discussed any challenges I see with this plan with the nurse or doctor.    ..........................................................................................................................................  Patient/Patient Representative Signature      ..........................................................................................................................................  Patient Representative Print Name and Relationship to Patient    ..................................................               ................................................  Date                                            Time    ..........................................................................................................................................  Reviewed by Signature/Title    ...................................................              ..............................................  Date                                                            Time

## 2018-06-16 NOTE — DISCHARGE INSTRUCTIONS
Discharge Instruction for Undelivered Patients      You were seen for: Labor Assessment and abdominal pain  We Consulted: Dr. Hall  You had (Test or Medicine): urinalysis     Diet:   Drink 8 to 12 glasses of liquids (milk, juice, water) every day.  You may eat meals and snacks.     Activity:  Call your doctor or nurse midwife if your baby is moving less than usual.     Call your provider if you notice:  Swelling in your face or increased swelling in your hands or legs.  Headaches that are not relieved by Tylenol (acetaminophen).  Changes in your vision (blurring: seeing spots or stars.)  Nausea (sick to your stomach) and vomiting (throwing up).   Weight gain of 5 pounds or more per week.  Heartburn that doesn't go away.  Signs of bladder infection: pain when you urinate (use the toilet), need to go more often and more urgently.  The bag of arroyo (rupture of membranes) breaks, or you notice leaking in your underwear.  Bright red blood in your underwear.  Abdominal (lower belly) or stomach pain.  For first baby: Contractions (tightening) less than 5 minutes apart for one hour or more.  Second (plus) baby: Contractions (tightening) less than 10 minutes apart and getting stronger.  *If less than 34 weeks: Contractions (tightenings) more than 6 times in one hour.  Increase or change in vaginal discharge (note the color and amount)  Other: Your doctor is prescribing a muscle relaxant. This medication may make you drowsy. It is not safe to drive while using this medication.    Follow-up:  As scheduled in the clinic. If the medication we give you does not help, call the clinic's nurse line tonight or the clinic during business hours.

## 2018-06-17 NOTE — PLAN OF CARE
Problem: Patient Care Overview  Goal: Plan of Care/Patient Progress Review  Outcome: Adequate for Discharge Date Met: 06/16/18  Data: Patient assessed in the [facility] for abdominal pain.  Cervical exam not examined.  Membranes intact.  Contractions come and go per patient report, none since arriving to [facility].  Action:  Presumed adequate fetal oxygenation documented (see flow record). Discharge instructions reviewed.  Patient instructed to report change in fetal movement, vaginal leaking of fluid or bleeding, abdominal pain, or any concerns related to the pregnancy to her nurse/physician.    Response: Orders to discharge home per Dr. Hall.  Patient verbalized understanding of education and verbalized agreement with plan. Discharged to home at 1810.

## 2018-08-09 ENCOUNTER — HOSPITAL ENCOUNTER (OUTPATIENT)
Dept: LAB | Facility: CLINIC | Age: 28
Discharge: HOME | End: 2018-08-09
Attending: OBSTETRICS & GYNECOLOGY | Admitting: OBSTETRICS & GYNECOLOGY
Payer: COMMERCIAL

## 2018-08-09 DIAGNOSIS — Z01.812 PRE-OPERATIVE LABORATORY EXAMINATION: ICD-10-CM

## 2018-08-09 LAB
ABO + RH BLD: NORMAL
ABO + RH BLD: NORMAL
BLD GP AB SCN SERPL QL: NORMAL
BLOOD BANK CMNT PATIENT-IMP: NORMAL
HGB BLD-MCNC: 11.2 G/DL (ref 11.7–15.7)
SPECIMEN EXP DATE BLD: NORMAL

## 2018-08-09 PROCEDURE — 36415 COLL VENOUS BLD VENIPUNCTURE: CPT | Performed by: OBSTETRICS & GYNECOLOGY

## 2018-08-09 PROCEDURE — 85018 HEMOGLOBIN: CPT | Performed by: OBSTETRICS & GYNECOLOGY

## 2018-08-09 PROCEDURE — 86900 BLOOD TYPING SEROLOGIC ABO: CPT | Performed by: OBSTETRICS & GYNECOLOGY

## 2018-08-09 PROCEDURE — 86780 TREPONEMA PALLIDUM: CPT | Performed by: OBSTETRICS & GYNECOLOGY

## 2018-08-09 PROCEDURE — 86901 BLOOD TYPING SEROLOGIC RH(D): CPT | Performed by: OBSTETRICS & GYNECOLOGY

## 2018-08-09 PROCEDURE — 86850 RBC ANTIBODY SCREEN: CPT | Performed by: OBSTETRICS & GYNECOLOGY

## 2018-08-10 ENCOUNTER — HOSPITAL ENCOUNTER (INPATIENT)
Facility: CLINIC | Age: 28
LOS: 3 days | Discharge: HOME | End: 2018-08-13
Attending: OBSTETRICS & GYNECOLOGY | Admitting: OBSTETRICS & GYNECOLOGY
Payer: COMMERCIAL

## 2018-08-10 ENCOUNTER — ANESTHESIA (OUTPATIENT)
Dept: OBGYN | Facility: CLINIC | Age: 28
End: 2018-08-10
Payer: COMMERCIAL

## 2018-08-10 ENCOUNTER — ANESTHESIA EVENT (OUTPATIENT)
Dept: OBGYN | Facility: CLINIC | Age: 28
End: 2018-08-10
Payer: COMMERCIAL

## 2018-08-10 ENCOUNTER — SURGERY (OUTPATIENT)
Age: 28
End: 2018-08-10

## 2018-08-10 DIAGNOSIS — D64.9 POSTOPERATIVE ANEMIA: ICD-10-CM

## 2018-08-10 DIAGNOSIS — Z98.891 HISTORY OF CESAREAN SECTION, CLASSICAL: ICD-10-CM

## 2018-08-10 DIAGNOSIS — G89.18 POSTOPERATIVE PAIN: ICD-10-CM

## 2018-08-10 LAB — T PALLIDUM AB SER QL: NONREACTIVE

## 2018-08-10 PROCEDURE — 37000008 ZZH ANESTHESIA TECHNICAL FEE, 1ST 30 MIN: Performed by: OBSTETRICS & GYNECOLOGY

## 2018-08-10 PROCEDURE — 25000125 ZZHC RX 250: Performed by: OBSTETRICS & GYNECOLOGY

## 2018-08-10 PROCEDURE — 27210794 ZZH OR GENERAL SUPPLY STERILE: Performed by: OBSTETRICS & GYNECOLOGY

## 2018-08-10 PROCEDURE — 25000132 ZZH RX MED GY IP 250 OP 250 PS 637: Performed by: OBSTETRICS & GYNECOLOGY

## 2018-08-10 PROCEDURE — 37000009 ZZH ANESTHESIA TECHNICAL FEE, EACH ADDTL 15 MIN: Performed by: OBSTETRICS & GYNECOLOGY

## 2018-08-10 PROCEDURE — 25000128 H RX IP 250 OP 636: Performed by: OBSTETRICS & GYNECOLOGY

## 2018-08-10 PROCEDURE — 71000014 ZZH RECOVERY PHASE 1 LEVEL 2 FIRST HR: Performed by: OBSTETRICS & GYNECOLOGY

## 2018-08-10 PROCEDURE — 36000058 ZZH SURGERY LEVEL 3 EA 15 ADDTL MIN: Performed by: OBSTETRICS & GYNECOLOGY

## 2018-08-10 PROCEDURE — 25000125 ZZHC RX 250: Performed by: NURSE ANESTHETIST, CERTIFIED REGISTERED

## 2018-08-10 PROCEDURE — 36000056 ZZH SURGERY LEVEL 3 1ST 30 MIN: Performed by: OBSTETRICS & GYNECOLOGY

## 2018-08-10 PROCEDURE — 25000128 H RX IP 250 OP 636: Performed by: NURSE ANESTHETIST, CERTIFIED REGISTERED

## 2018-08-10 PROCEDURE — 12000029 ZZH R&B OB INTERMEDIATE

## 2018-08-10 RX ORDER — KETOROLAC TROMETHAMINE 30 MG/ML
30 INJECTION, SOLUTION INTRAMUSCULAR; INTRAVENOUS EVERY 6 HOURS
Status: COMPLETED | OUTPATIENT
Start: 2018-08-10 | End: 2018-08-11

## 2018-08-10 RX ORDER — OXYTOCIN/0.9 % SODIUM CHLORIDE 30/500 ML
100 PLASTIC BAG, INJECTION (ML) INTRAVENOUS CONTINUOUS
Status: DISCONTINUED | OUTPATIENT
Start: 2018-08-10 | End: 2018-08-13 | Stop reason: HOSPADM

## 2018-08-10 RX ORDER — AMOXICILLIN 250 MG
2 CAPSULE ORAL 2 TIMES DAILY PRN
Status: DISCONTINUED | OUTPATIENT
Start: 2018-08-10 | End: 2018-08-13 | Stop reason: HOSPADM

## 2018-08-10 RX ORDER — SODIUM CHLORIDE, SODIUM LACTATE, POTASSIUM CHLORIDE, CALCIUM CHLORIDE 600; 310; 30; 20 MG/100ML; MG/100ML; MG/100ML; MG/100ML
INJECTION, SOLUTION INTRAVENOUS CONTINUOUS
Status: DISCONTINUED | OUTPATIENT
Start: 2018-08-10 | End: 2018-08-10

## 2018-08-10 RX ORDER — HYDROCORTISONE 2.5 %
CREAM (GRAM) TOPICAL 3 TIMES DAILY PRN
Status: DISCONTINUED | OUTPATIENT
Start: 2018-08-10 | End: 2018-08-13 | Stop reason: HOSPADM

## 2018-08-10 RX ORDER — CEFAZOLIN SODIUM 2 G/100ML
2 INJECTION, SOLUTION INTRAVENOUS
Status: COMPLETED | OUTPATIENT
Start: 2018-08-10 | End: 2018-08-10

## 2018-08-10 RX ORDER — IBUPROFEN 400 MG/1
400 TABLET, FILM COATED ORAL EVERY 6 HOURS PRN
Status: DISCONTINUED | OUTPATIENT
Start: 2018-08-10 | End: 2018-08-10

## 2018-08-10 RX ORDER — PRENATAL VIT/IRON FUM/FOLIC AC 27MG-0.8MG
1 TABLET ORAL DAILY
Status: DISCONTINUED | OUTPATIENT
Start: 2018-08-10 | End: 2018-08-13 | Stop reason: HOSPADM

## 2018-08-10 RX ORDER — CLONIDINE 100 UG/ML
INJECTION, SOLUTION EPIDURAL PRN
Status: DISCONTINUED | OUTPATIENT
Start: 2018-08-10 | End: 2018-08-10

## 2018-08-10 RX ORDER — NALBUPHINE HYDROCHLORIDE 10 MG/ML
2.5-5 INJECTION, SOLUTION INTRAMUSCULAR; INTRAVENOUS; SUBCUTANEOUS EVERY 6 HOURS PRN
Status: DISCONTINUED | OUTPATIENT
Start: 2018-08-10 | End: 2018-08-10

## 2018-08-10 RX ORDER — HYDROMORPHONE HYDROCHLORIDE 1 MG/ML
.3-.5 INJECTION, SOLUTION INTRAMUSCULAR; INTRAVENOUS; SUBCUTANEOUS EVERY 30 MIN PRN
Status: DISCONTINUED | OUTPATIENT
Start: 2018-08-10 | End: 2018-08-13 | Stop reason: HOSPADM

## 2018-08-10 RX ORDER — CEFAZOLIN SODIUM 1 G/3ML
1 INJECTION, POWDER, FOR SOLUTION INTRAMUSCULAR; INTRAVENOUS SEE ADMIN INSTRUCTIONS
Status: DISCONTINUED | OUTPATIENT
Start: 2018-08-10 | End: 2018-08-10

## 2018-08-10 RX ORDER — MORPHINE SULFATE 0.5 MG/ML
0.15 INJECTION, SOLUTION EPIDURAL; INTRATHECAL; INTRAVENOUS ONCE
Status: DISCONTINUED | OUTPATIENT
Start: 2018-08-10 | End: 2018-08-10

## 2018-08-10 RX ORDER — FENTANYL CITRATE 50 UG/ML
10 INJECTION, SOLUTION INTRAMUSCULAR; INTRAVENOUS ONCE
Status: DISCONTINUED | OUTPATIENT
Start: 2018-08-10 | End: 2018-08-10

## 2018-08-10 RX ORDER — OXYCODONE HYDROCHLORIDE 5 MG/1
5-10 TABLET ORAL
Status: DISCONTINUED | OUTPATIENT
Start: 2018-08-10 | End: 2018-08-13 | Stop reason: HOSPADM

## 2018-08-10 RX ORDER — DIPHENHYDRAMINE HYDROCHLORIDE 50 MG/ML
25 INJECTION INTRAMUSCULAR; INTRAVENOUS EVERY 6 HOURS PRN
Status: DISCONTINUED | OUTPATIENT
Start: 2018-08-10 | End: 2018-08-13 | Stop reason: HOSPADM

## 2018-08-10 RX ORDER — OXYTOCIN/0.9 % SODIUM CHLORIDE 30/500 ML
340 PLASTIC BAG, INJECTION (ML) INTRAVENOUS CONTINUOUS PRN
Status: DISCONTINUED | OUTPATIENT
Start: 2018-08-10 | End: 2018-08-13 | Stop reason: HOSPADM

## 2018-08-10 RX ORDER — FENTANYL CITRATE 50 UG/ML
INJECTION, SOLUTION INTRAMUSCULAR; INTRAVENOUS PRN
Status: DISCONTINUED | OUTPATIENT
Start: 2018-08-10 | End: 2018-08-10

## 2018-08-10 RX ORDER — CITRIC ACID/SODIUM CITRATE 334-500MG
30 SOLUTION, ORAL ORAL
Status: COMPLETED | OUTPATIENT
Start: 2018-08-10 | End: 2018-08-10

## 2018-08-10 RX ORDER — ONDANSETRON 2 MG/ML
INJECTION INTRAMUSCULAR; INTRAVENOUS PRN
Status: DISCONTINUED | OUTPATIENT
Start: 2018-08-10 | End: 2018-08-10

## 2018-08-10 RX ORDER — DEXTROSE, SODIUM CHLORIDE, SODIUM LACTATE, POTASSIUM CHLORIDE, AND CALCIUM CHLORIDE 5; .6; .31; .03; .02 G/100ML; G/100ML; G/100ML; G/100ML; G/100ML
INJECTION, SOLUTION INTRAVENOUS CONTINUOUS
Status: DISCONTINUED | OUTPATIENT
Start: 2018-08-10 | End: 2018-08-13 | Stop reason: HOSPADM

## 2018-08-10 RX ORDER — EPHEDRINE SULFATE 50 MG/ML
5 INJECTION, SOLUTION INTRAMUSCULAR; INTRAVENOUS; SUBCUTANEOUS
Status: DISCONTINUED | OUTPATIENT
Start: 2018-08-10 | End: 2018-08-10

## 2018-08-10 RX ORDER — LIDOCAINE 40 MG/G
CREAM TOPICAL
Status: DISCONTINUED | OUTPATIENT
Start: 2018-08-10 | End: 2018-08-10

## 2018-08-10 RX ORDER — LIDOCAINE 40 MG/G
CREAM TOPICAL
Status: DISCONTINUED | OUTPATIENT
Start: 2018-08-10 | End: 2018-08-13 | Stop reason: HOSPADM

## 2018-08-10 RX ORDER — DIPHENHYDRAMINE HCL 25 MG
25 CAPSULE ORAL EVERY 6 HOURS PRN
Status: DISCONTINUED | OUTPATIENT
Start: 2018-08-10 | End: 2018-08-13 | Stop reason: HOSPADM

## 2018-08-10 RX ORDER — IBUPROFEN 800 MG/1
800 TABLET, FILM COATED ORAL EVERY 6 HOURS PRN
Status: DISCONTINUED | OUTPATIENT
Start: 2018-08-10 | End: 2018-08-10

## 2018-08-10 RX ORDER — AMOXICILLIN 250 MG
1 CAPSULE ORAL 2 TIMES DAILY PRN
Status: DISCONTINUED | OUTPATIENT
Start: 2018-08-10 | End: 2018-08-13 | Stop reason: HOSPADM

## 2018-08-10 RX ORDER — IBUPROFEN 600 MG/1
600 TABLET, FILM COATED ORAL EVERY 6 HOURS PRN
Status: DISCONTINUED | OUTPATIENT
Start: 2018-08-10 | End: 2018-08-10

## 2018-08-10 RX ORDER — IBUPROFEN 800 MG/1
800 TABLET, FILM COATED ORAL EVERY 6 HOURS PRN
Status: DISCONTINUED | OUTPATIENT
Start: 2018-08-11 | End: 2018-08-13 | Stop reason: HOSPADM

## 2018-08-10 RX ORDER — MISOPROSTOL 200 UG/1
400 TABLET ORAL
Status: DISCONTINUED | OUTPATIENT
Start: 2018-08-10 | End: 2018-08-13 | Stop reason: HOSPADM

## 2018-08-10 RX ORDER — BISACODYL 10 MG
10 SUPPOSITORY, RECTAL RECTAL DAILY PRN
Status: DISCONTINUED | OUTPATIENT
Start: 2018-08-12 | End: 2018-08-13 | Stop reason: HOSPADM

## 2018-08-10 RX ORDER — ONDANSETRON 2 MG/ML
4 INJECTION INTRAMUSCULAR; INTRAVENOUS EVERY 6 HOURS PRN
Status: DISCONTINUED | OUTPATIENT
Start: 2018-08-10 | End: 2018-08-13 | Stop reason: HOSPADM

## 2018-08-10 RX ORDER — ACETAMINOPHEN 325 MG/1
975 TABLET ORAL EVERY 8 HOURS
Status: COMPLETED | OUTPATIENT
Start: 2018-08-10 | End: 2018-08-13

## 2018-08-10 RX ORDER — NALOXONE HYDROCHLORIDE 0.4 MG/ML
.1-.4 INJECTION, SOLUTION INTRAMUSCULAR; INTRAVENOUS; SUBCUTANEOUS
Status: DISCONTINUED | OUTPATIENT
Start: 2018-08-10 | End: 2018-08-10

## 2018-08-10 RX ORDER — LANOLIN 100 %
OINTMENT (GRAM) TOPICAL
Status: DISCONTINUED | OUTPATIENT
Start: 2018-08-10 | End: 2018-08-13 | Stop reason: HOSPADM

## 2018-08-10 RX ORDER — DEXAMETHASONE SODIUM PHOSPHATE 4 MG/ML
INJECTION, SOLUTION INTRA-ARTICULAR; INTRALESIONAL; INTRAMUSCULAR; INTRAVENOUS; SOFT TISSUE PRN
Status: DISCONTINUED | OUTPATIENT
Start: 2018-08-10 | End: 2018-08-10

## 2018-08-10 RX ORDER — ACETAMINOPHEN 325 MG/1
650 TABLET ORAL EVERY 4 HOURS PRN
Status: DISCONTINUED | OUTPATIENT
Start: 2018-08-13 | End: 2018-08-13 | Stop reason: HOSPADM

## 2018-08-10 RX ORDER — NALOXONE HYDROCHLORIDE 0.4 MG/ML
.1-.4 INJECTION, SOLUTION INTRAMUSCULAR; INTRAVENOUS; SUBCUTANEOUS
Status: DISCONTINUED | OUTPATIENT
Start: 2018-08-10 | End: 2018-08-13 | Stop reason: HOSPADM

## 2018-08-10 RX ORDER — IBUPROFEN 600 MG/1
600 TABLET, FILM COATED ORAL EVERY 6 HOURS PRN
Status: DISCONTINUED | OUTPATIENT
Start: 2018-08-11 | End: 2018-08-13 | Stop reason: HOSPADM

## 2018-08-10 RX ORDER — IBUPROFEN 400 MG/1
400 TABLET, FILM COATED ORAL EVERY 6 HOURS PRN
Status: DISCONTINUED | OUTPATIENT
Start: 2018-08-11 | End: 2018-08-13 | Stop reason: HOSPADM

## 2018-08-10 RX ORDER — OXYTOCIN 10 [USP'U]/ML
10 INJECTION, SOLUTION INTRAMUSCULAR; INTRAVENOUS
Status: DISCONTINUED | OUTPATIENT
Start: 2018-08-10 | End: 2018-08-13 | Stop reason: HOSPADM

## 2018-08-10 RX ORDER — BUPIVACAINE HYDROCHLORIDE 5 MG/ML
INJECTION, SOLUTION EPIDURAL; INTRACAUDAL PRN
Status: DISCONTINUED | OUTPATIENT
Start: 2018-08-10 | End: 2018-08-10

## 2018-08-10 RX ORDER — SIMETHICONE 80 MG
80 TABLET,CHEWABLE ORAL 4 TIMES DAILY PRN
Status: DISCONTINUED | OUTPATIENT
Start: 2018-08-10 | End: 2018-08-13 | Stop reason: HOSPADM

## 2018-08-10 RX ORDER — MORPHINE SULFATE 1 MG/ML
INJECTION, SOLUTION EPIDURAL; INTRATHECAL; INTRAVENOUS PRN
Status: DISCONTINUED | OUTPATIENT
Start: 2018-08-10 | End: 2018-08-10

## 2018-08-10 RX ORDER — OXYTOCIN/0.9 % SODIUM CHLORIDE 30/500 ML
PLASTIC BAG, INJECTION (ML) INTRAVENOUS PRN
Status: DISCONTINUED | OUTPATIENT
Start: 2018-08-10 | End: 2018-08-10

## 2018-08-10 RX ADMIN — ONDANSETRON 4 MG: 2 INJECTION INTRAMUSCULAR; INTRAVENOUS at 07:03

## 2018-08-10 RX ADMIN — DIPHENHYDRAMINE HYDROCHLORIDE 25 MG: 25 CAPSULE ORAL at 23:54

## 2018-08-10 RX ADMIN — BUPIVACAINE HYDROCHLORIDE 14 MG: 5 INJECTION, SOLUTION EPIDURAL; INTRACAUDAL; PERINEURAL at 07:03

## 2018-08-10 RX ADMIN — MIDAZOLAM 1 MG: 1 INJECTION INTRAMUSCULAR; INTRAVENOUS at 07:15

## 2018-08-10 RX ADMIN — OXYTOCIN-SODIUM CHLORIDE 0.9% IV SOLN 30 UNIT/500ML 100 ML: 30-0.9/5 SOLUTION at 07:25

## 2018-08-10 RX ADMIN — SODIUM CHLORIDE, POTASSIUM CHLORIDE, SODIUM LACTATE AND CALCIUM CHLORIDE 1000 ML: 600; 310; 30; 20 INJECTION, SOLUTION INTRAVENOUS at 06:08

## 2018-08-10 RX ADMIN — DIPHENHYDRAMINE HYDROCHLORIDE 25 MG: 25 CAPSULE ORAL at 09:49

## 2018-08-10 RX ADMIN — CLONIDINE HYDROCHLORIDE 15 MCG: 0.1 INJECTION, SOLUTION EPIDURAL at 07:03

## 2018-08-10 RX ADMIN — OXYCODONE HYDROCHLORIDE 5 MG: 5 TABLET ORAL at 23:53

## 2018-08-10 RX ADMIN — FENTANYL CITRATE 85 MCG: 50 INJECTION, SOLUTION INTRAMUSCULAR; INTRAVENOUS at 07:15

## 2018-08-10 RX ADMIN — Medication 0.3 MG: at 20:45

## 2018-08-10 RX ADMIN — ACETAMINOPHEN 975 MG: 325 TABLET, FILM COATED ORAL at 09:24

## 2018-08-10 RX ADMIN — KETOROLAC TROMETHAMINE 30 MG: 30 INJECTION, SOLUTION INTRAMUSCULAR at 13:20

## 2018-08-10 RX ADMIN — DEXAMETHASONE SODIUM PHOSPHATE 4 MG: 4 INJECTION, SOLUTION INTRA-ARTICULAR; INTRALESIONAL; INTRAMUSCULAR; INTRAVENOUS; SOFT TISSUE at 07:03

## 2018-08-10 RX ADMIN — SODIUM CHLORIDE, POTASSIUM CHLORIDE, SODIUM LACTATE AND CALCIUM CHLORIDE: 600; 310; 30; 20 INJECTION, SOLUTION INTRAVENOUS at 06:58

## 2018-08-10 RX ADMIN — MIDAZOLAM 1 MG: 1 INJECTION INTRAMUSCULAR; INTRAVENOUS at 07:03

## 2018-08-10 RX ADMIN — ACETAMINOPHEN 975 MG: 325 TABLET, FILM COATED ORAL at 18:21

## 2018-08-10 RX ADMIN — CEFAZOLIN SODIUM 2 G: 2 INJECTION, SOLUTION INTRAVENOUS at 06:58

## 2018-08-10 RX ADMIN — SODIUM CHLORIDE, SODIUM LACTATE, POTASSIUM CHLORIDE, CALCIUM CHLORIDE AND DEXTROSE MONOHYDRATE: 5; 600; 310; 30; 20 INJECTION, SOLUTION INTRAVENOUS at 15:23

## 2018-08-10 RX ADMIN — SENNOSIDES AND DOCUSATE SODIUM 1 TABLET: 8.6; 5 TABLET ORAL at 19:15

## 2018-08-10 RX ADMIN — DIPHENHYDRAMINE HYDROCHLORIDE 25 MG: 50 INJECTION, SOLUTION INTRAMUSCULAR; INTRAVENOUS at 16:00

## 2018-08-10 RX ADMIN — SODIUM CHLORIDE, POTASSIUM CHLORIDE, SODIUM LACTATE AND CALCIUM CHLORIDE: 600; 310; 30; 20 INJECTION, SOLUTION INTRAVENOUS at 07:30

## 2018-08-10 RX ADMIN — FENTANYL CITRATE 15 MCG: 50 INJECTION, SOLUTION INTRAMUSCULAR; INTRAVENOUS at 07:03

## 2018-08-10 RX ADMIN — Medication 0.15 MG: at 07:03

## 2018-08-10 RX ADMIN — SODIUM CITRATE AND CITRIC ACID MONOHYDRATE 30 ML: 500; 334 SOLUTION ORAL at 06:40

## 2018-08-10 RX ADMIN — PHENYLEPHRINE HYDROCHLORIDE 200 MCG: 10 INJECTION, SOLUTION INTRAMUSCULAR; INTRAVENOUS; SUBCUTANEOUS at 07:03

## 2018-08-10 RX ADMIN — KETOROLAC TROMETHAMINE 30 MG: 30 INJECTION, SOLUTION INTRAMUSCULAR at 19:16

## 2018-08-10 RX ADMIN — OXYTOCIN-SODIUM CHLORIDE 0.9% IV SOLN 30 UNIT/500ML 100 ML/HR: 30-0.9/5 SOLUTION at 09:52

## 2018-08-10 NOTE — ANESTHESIA CARE TRANSFER NOTE
Patient: Carmen Rousseau    Procedure(s):  Repeat  Section - Wound Class: II-Clean Contaminated    Diagnosis: Repeat, Classical Incision  Diagnosis Additional Information: Previous c/S at term  Kang Merrill and Victorina    Anesthesia Type:   Spinal     Note:  Airway :Room Air  Patient transferred to:Labor and Delivery  Comments: VSS. Report to RN.      Vitals: (Last set prior to Anesthesia Care Transfer)    CRNA VITALS  8/10/2018 0749 - 8/10/2018 0824      8/10/2018             Pulse: 61    SpO2: 100 %                Electronically Signed By: DANIELLA Jameson CRNA  August 10, 2018  8:24 AM

## 2018-08-10 NOTE — IP AVS SNAPSHOT
MRN:[id number]                      After Visit Summary   8/10/2018    Carmen Rousseau    MRN: [id number]           Thank you!     Thank you for choosing [facility] for your care. Our goal is always to provide you with excellent care. Hearing back from our patients is one way we can continue to improve our services. Please take a few minutes to complete the written survey that you may receive in the mail after you visit. If you would like to speak to someone directly about your visit please contact Patient Relations at [phone]. Thank you!          Patient Information     Date Of Birth          1990        Designated Caregiver       Most Recent Value    Caregiver    Will someone help with your care after discharge? no      About your hospital stay     You were admitted on:  August 10, 2018 You last received care in the:  [facility]    You were discharged on:  2018        Reason for your hospital stay       Maternity care                  Who to Call     For medical emergencies, please call 911.  For non-urgent questions about your medical care, please call your primary care provider or clinic, [phone]  For questions related to your surgery, please call your surgery clinic        Attending Provider     Provider Specialty    Kayla Merrill, DO OB/Gyn       Primary Care Provider Office Phone # Fax #    [facility] [phone]      After Care Instructions     Activity       Review discharge instructions            Diet       Resume previous diet            Discharge Instructions - Postpartum visit       Schedule postpartum visit with your OB provider and return to clinic in 6 weeks.                  Further instructions from your care team       Postop  Birth Instructions    Follow up in clinic at 6 weeks post partum    Lactation [phone]      Activity       Do not lift more than 10 pounds for 6 weeks  after surgery.  Ask family and friends for help when you need it.    No driving until you have stopped taking your pain medications (usually two weeks after surgery).    No heavy exercise or activity for 6 weeks.  Don't do anything that will put a strain on your surgery site.    Don't strain when using the toilet.  Your care team may prescribe a stool softener if you have problems with your bowel movements.     To care for your incision:       Keep the incision clean and dry.    Do not soak your incision in water. No swimming or hot tubs until it has fully healed. You may soak in the bathtub if the water level is below your incision.    Do not use peroxide, gel, cream, lotion, or ointment on your incision.    Adjust your clothes to avoid pressure on your surgery site (check the elastic in your underwear for example).     You may see a small amount of clear or pink drainage and this is normal.  Check with your health care provider:       If the drainage increases or has an odor.    If the incision reddens, you have swelling, or develop a rash.    If you have increased pain and the medicine we prescribed doesn't help.    If you have a fever above 100.4 F (38 C) with or without chills when placing thermometer under your tongue.   The area around your incision (surgery wound), will feel numb.  This is normal. The numbness should go away in less than a year.     Keep your hands clean:  Always wash your hands before touching your incision (surgery wound). This helps reduce your risk of infection. If your hands aren't dirty, you may use an alcohol hand-rub to clean your hands. Keep your nails clean and short.    Call your healthcare provider if you have any of these symptoms:       You soak a sanitary pad with blood within 1 hour, or you see blood clots larger than a golf ball.    Bleeding that lasts more than 6 weeks.    Vaginal discharge that smells bad.    Severe pain, cramping or tenderness in your lower belly  "area.    A need to urinate more frequently (use the toilet more often), more urgently (use the toilet very quickly), or it burns when you urinate.    Nausea and vomiting.    Redness, swelling or pain around a vein in your leg.    Problems breastfeeding or a red or painful area on your breast.    Chest pain and cough or are gasping for air.    Problems with coping with sadness, anxiety or depression. If you have concerns about hurting yourself or the baby, call your provider immediately.      You have questions or concerns after you return home.                  Pending Results     No orders found from 2018 to 2018.            Statement of Approval     Ordered          18 0749  I have reviewed and agree with all the recommendations and orders detailed in this document.  EFFECTIVE NOW     Approved and electronically signed by:  Tan Moss MD             Admission Information     Date & Time Provider Department Dept. Phone    8/10/2018 Kayla Merrill,  [facility] Postpartum [phone]      Your Vitals Were     Blood Pressure Pulse Temperature Respirations Height Weight    104/68 86 98  F (36.7  C) (Oral) 16 1.676 m (5' 6\") 65.8 kg (145 lb)    Pulse Oximetry BMI (Body Mass Index)                99% 23.4 kg/m2          [employer] Information     [employer] lets you send messages to your doctor, view your test results, renew your prescriptions, schedule appointments and more. To sign up, go to [email or web address] . Click on \"Log in\" on the left side of the screen, which will take you to the Welcome page. Then click on \"Sign up Now\" on the right side of the page.     You will be asked to enter the access code listed below, as well as some personal information. Please follow the directions to create your username and password.     Your access code is: [email or web address]  Expires: 2018  8:11 AM     Your access code will  in 90 days. If you need help or a new code, please call your " [facility] or [phone].        Care EveryWhere ID     This is your Care EveryWhere ID. This could be used by other organizations to access your [place] medical records  [id number]        Equal Access to Services     AFSHAN LUCIO: Hadii aad ku hadsadiegreg Sopadminiali, waaxda luqadaha, qaybta kaalmada adedamarisda, nael saldañakell mcguiregennaro wade michael lucio. So [facility] [phone].    ATENCIÓN: Si habla español, tiene a mckeon disposición servicios gratuitos de asistencia lingüística. Llame al [phone].    We comply with applicable federal civil rights laws and [place] laws. We do not discriminate on the basis of race, color, national origin, age, disability, sex, sexual orientation, or gender identity.               Review of your medicines      START taking        Dose / Directions    ibuprofen 600 MG tablet   Commonly known as:  ADVIL/MOTRIN   Used for:  Postoperative pain        Dose:  600 mg   Take 1 tablet (600 mg) by mouth every 6 hours as needed for pain Take w/ food   Quantity:  30 tablet   Refills:  0       oxyCODONE IR 5 MG tablet   Commonly known as:  ROXICODONE   Used for:  Postoperative pain        Dose:  5 mg   Take 1 tablet (5 mg) by mouth every 6 hours as needed for breakthrough pain or other (pain control or improvement in physical function. Hold dose for analgesic side effects.)   Quantity:  10 tablet   Refills:  0         CONTINUE these medicines which have NOT CHANGED        Dose / Directions    prenatal multivitamin plus iron 27-0.8 MG Tabs per tablet        Dose:  1 tablet   Take 1 tablet by mouth daily   Refills:  0         STOP taking     ASPIRIN PO                Where to get your medicines      Some of these will need a paper prescription and others can be bought over the counter. Ask your nurse if you have questions.     Bring a paper prescription for each of these medications     ibuprofen 600 MG tablet    oxyCODONE IR 5 MG tablet                Protect others around you: Learn how to  safely use, store and throw away your medicines at www.disposemymeds.org.        Information about OPIOIDS     PRESCRIPTION OPIOIDS: WHAT YOU NEED TO KNOW   We gave you an opioid (narcotic) pain medicine. It is important to manage your pain, but opioids are not always the best choice. You should first try all the other options your care team gave you. Take this medicine for as short a time (and as few doses) as possible.    Some activities can increase your pain, such as bandage changes or therapy sessions. It may help to take your pain medicine 30 to 60 minutes before these activities. Reduce your stress by getting enough sleep, working on hobbies you enjoy and practicing relaxation or meditation. Talk to your care team about ways to manage your pain beyond prescription opioids.    These medicines have risks:    DO NOT drive when on new or higher doses of pain medicine. These medicines can affect your alertness and reaction times, and you could be arrested for driving under the influence (DUI). If you need to use opioids long-term, talk to your care team about driving.    DO NOT operate heavy machinery    DO NOT do any other dangerous activities while taking these medicines.    DO NOT drink any alcohol while taking these medicines.     If the opioid prescribed includes acetaminophen, DO NOT take with any other medicines that contain acetaminophen. Read all labels carefully. Look for the word  acetaminophen  or  Tylenol.  Ask your pharmacist if you have questions or are unsure.    You can get addicted to pain medicines, especially if you have a history of addiction (chemical, alcohol or substance dependence). Talk to your care team about ways to reduce this risk.    All opioids tend to cause constipation. Drink plenty of water and eat foods that have a lot of fiber, such as fruits, vegetables, prune juice, apple juice and high-fiber cereal. Take a laxative (Miralax, milk of magnesia, Colace, Senna) if you don t move  your bowels at least every other day. Other side effects include upset stomach, sleepiness, dizziness, throwing up, tolerance (needing more of the medicine to have the same effect), physical dependence and slowed breathing.    Store your pills in a secure place, locked if possible. We will not replace any lost or stolen medicine. If you don t finish your medicine, please throw away (dispose) as directed by your pharmacist. The [place] Pollution Control Agency has more information about safe disposal: https://www.pca.[facility].mn.us/living-green/managing-unwanted-medications             Medication List: This is a list of all your medications and when to take them. Check marks below indicate your daily home schedule. Keep this list as a reference.      Medications           Morning Afternoon Evening Bedtime As Needed    ibuprofen 600 MG tablet   Commonly known as:  ADVIL/MOTRIN   Take 1 tablet (600 mg) by mouth every 6 hours as needed for pain Take w/ food   Last time this was given:  800 mg on 8/13/2018  3:47 AM                                oxyCODONE IR 5 MG tablet   Commonly known as:  ROXICODONE   Take 1 tablet (5 mg) by mouth every 6 hours as needed for breakthrough pain or other (pain control or improvement in physical function. Hold dose for analgesic side effects.)   Last time this was given:  10 mg on 8/13/2018  5:00 AM                                prenatal multivitamin plus iron 27-0.8 MG Tabs per tablet   Take 1 tablet by mouth daily   Last time this was given:  1 tablet on 8/12/2018  7:42 PM

## 2018-08-10 NOTE — PROGRESS NOTES
Patient meeting expected goals. Using Toradol. Infant in NICU, patient requests to start pumping this evening. Had some itching, using benadryl. Tolerating crackers and fluid, no nausea. Education and room orientation done.

## 2018-08-10 NOTE — ANESTHESIA PREPROCEDURE EVALUATION
PAC NOTE:       ANESTHESIA PRE EVALUATION:  Anesthesia Evaluation     . Pt has had prior anesthetic. Type: General and Regional    No history of anesthetic complications          ROS/MED HX    ENT/Pulmonary:  - neg pulmonary ROS     Neurologic:  - neg neurologic ROS     Cardiovascular: Comment: PIH in previous pregnancy - neg cardiovascular ROS       METS/Exercise Tolerance:     Hematologic:  - neg hematologic  ROS       Musculoskeletal:  - neg musculoskeletal ROS       GI/Hepatic:  - neg GI/hepatic ROS       Renal/Genitourinary:  - ROS Renal section negative       Endo:  - neg endo ROS       Psychiatric:  - neg psychiatric ROS       Infectious Disease:   (+) Other Infectious Disease Zika virus exposure      Malignancy:      - no malignancy   Other: Comment: Pregnant at term for elective C/S   (+) Possibly pregnant                    Physical Exam  Normal systems: cardiovascular, pulmonary and dental    Airway   Mallampati: I  TM distance: >3 FB  Neck ROM: full    Dental     Cardiovascular   Rhythm and rate: regular and normal      Pulmonary    breath sounds clear to auscultation    Other findings: Lab Test        08/09/18     05/04/18     05/22/15      --          11/26/14                       1319          2130          0540           --           1724          WBC           --          6.4          5.5           --          5.3           HGB          11.2*        11.5*        11.2*          < >        12.6          MCV           --          90           80            --          86            PLT           --          193          284           --          179            < > = values in this interval not displayed.                  Lab Test        05/04/18     04/03/18     05/22/15                       2130          1705          0540          NA           136          135          137           POTASSIUM    3.6          3.4          3.5           CHLORIDE     105          105          106           CO2           22           25           22            BUN          5*           7            13            CR           0.56         0.45*        0.75          ANIONGAP     9            5            9             JESSICA          8.2*         8.1*         8.5           GLC          78           73           89                           Anesthesia Plan      History & Physical Review  History and physical reviewed and following examination; no interval change.    ASA Status:  2 .    NPO Status:  > 8 hours    Plan for Spinal   PONV prophylaxis:  Ondansetron (or other 5HT-3) and Dexamethasone or Solumedrol  GA back up.  Increased failure rate with isobaric medications      Postoperative Care  Postoperative pain management:  IV analgesics, Oral pain medications, Neuraxial analgesia and Multi-modal analgesia.      Consents  Anesthetic plan, risks, benefits and alternatives discussed with:  Patient.  Use of blood products discussed: No .   .                            .

## 2018-08-10 NOTE — PLAN OF CARE
Data: Carmen Rousseau transferred to [facility] via wheelchair at 1040 after seeing infant in NICU.   Action: Receiving unit notified of transfer: Yes. Patient and family notified of room change. Report given to PHOEBE Young at 1050. Belongings sent to receiving unit. Accompanied by Registered Nurse. Oriented patient to surroundings. Call light within reach. ID bands double-checked with receiving RN.  Response: Patient tolerated transfer and is stable.

## 2018-08-10 NOTE — L&D DELIVERY NOTE
Brief Op Note  Pre-op Dx: IUP 36 /, Hx of prior classical C/S at 27 weeks, hx 2 prior  sections, shortened cervix this pregnancy, enlarged thyroid with normal labs, EIF declined testing  Post-op Dx: Same  Name of Procedure: Repeat  Section  Surgeon: Gelacio Merrill  Assistant:  Abhi Prajapati  Anesthesia:  Spinal  Operative Findings:  1. 5 #, 11 oz female infant with Apgars5 at one minute and 8 at five minutes and 9 at ten minutes  2. Normal uterus and adnexa  Drains: Crockett, 150 ml clear urine  EBL: 600 ml  IV Fluids: 2400 ml LR  Dr. Kayla Merrill

## 2018-08-10 NOTE — PLAN OF CARE
Problem: Patient Care Overview  Goal: Plan of Care/Patient Progress Review  Outcome: No Change  Pt still has itching and requested for Benadryl and given IV .  Denies pain at this time. Plans to get out of bed tonight and see baby in NICU. Tolerating regular diet.

## 2018-08-10 NOTE — ANESTHESIA POSTPROCEDURE EVALUATION
Patient: Carmen Rousseau    Procedure(s):  Repeat  Section - Wound Class: II-Clean Contaminated    Diagnosis:Repeat, Classical Incision  Diagnosis Additional Information: Previous c/S at term  Kang Merrill and Victorina    Anesthesia Type:  Spinal    Note:  Anesthesia Post Evaluation    Patient location during evaluation: OB PACU  Patient participation: Able to participate in evaluation but full recovery from regional anesthesia has not yet ocurrred but is anticipated to occur within 48 hours  Level of consciousness: awake  Pain management: adequate  Airway patency: patent  Cardiovascular status: acceptable  Respiratory status: acceptable  Hydration status: acceptable  PONV: none             Last vitals:  Vitals:    08/10/18 0611 08/10/18 0833   Resp:  16   Temp: 98.7  F (37.1  C)          Electronically Signed By: Eulogio Pacheco MD  August 10, 2018  8:36 AM

## 2018-08-10 NOTE — IP AVS SNAPSHOT
[facility]    [address]    [facility] [address]    Phone:  [phone]    Fax:  [phone]                                       After Visit Summary   8/10/2018    Carmen Rousseau    MRN: [id number]           After Visit Summary Signature Page     I have received my discharge instructions, and my questions have been answered. I have discussed any challenges I see with this plan with the nurse or doctor.    ..........................................................................................................................................  Patient/Patient Representative Signature      ..........................................................................................................................................  Patient Representative Print Name and Relationship to Patient    ..................................................               ................................................  Date                                            Time    ..........................................................................................................................................  Reviewed by Signature/Title    ...................................................              ..............................................  Date                                                            Time

## 2018-08-10 NOTE — OP NOTE
Operative Note    Pre-op Diagnosis:  Intrauterine Pregnancy at 36 6/7 weeks, History of prior classical  section at 27 weeks, History of 2 prior  sections, enlarged thyroid with normal labs, EIF declined testing  Post-op Diagnosis:  Same, Term live female infant delivered    Procedure: Repeat Low Transverse  Section    Surgeon:  Kayla Merrill D.O.  Assistant:  Abhi Prajapati M.D.  Anesthesia:  Spinal  Fluids:  2400 ml LR, Ancef 2gm pre-op, Pitocin after cord clamp  EBL:  600 ml  Drains:  Crockett 150 ml - clear yellow urine during the case    Indications for Procedure:  Patient is a 28 year old  with an intrauterine pregnancy at 36 6/7 weeks who has had a  section before and declines trial of labor with this pregnancy.  She is to undergo repeat  section.  She has a hx of a classical  section at 27 weeks followed by  demise in the past.  [facility] has recommended RLTCS between 36-37 weeks to avoid risk of uterine rupture with previous classical incision.  Prenatal care has been complicated by enlarged thyroid, s/p Endocrine consult with normal labs and needs follow-up postpartum, shortened cervix followed on US by [facility], hx of pre-eclampsia on daily ASA  which was stopped at 36 weeks, EIF noted on level 2 US and declined further testing.  She understands risks, benefits, and alternatives to the above and has given informed consent.      Findings:  Term live female infant, VERTEX, 5 lb 11 oz, Apgars 5 (1min) 8 (5min) 9 (10min), amniotic fluid-Clear, 3v cord, normal placenta, normal uterus, tubes and ovaries bilaterally.  Thin CRISELDA noted prior to hysterotomy.    Specimens:  Cord blood.    Procedure in detail:  After proper patient identification and informed consent was obtained, the patient was taken to the operating room where adequate Spinal anesthesia was obtained.  Patient was placed in dorsal supine position with leftward tilt.  Crockett catheter was placed.   Patient was prepped and draped in usual sterile manner for this procedure.    A Pfannenstiel skin incision was made with a knife and carried down through the underlying subcutaneous fat to the anterior rectus fascia.  Bleeders were cauterized along the way.  The anterior rectus fascia was incised transversely with a knife and the incision was extended bilaterally in a semilunar fashion with curved Xie scissors.  The superior and inferior edges of the fascial incision were tented with Kocher clamps and sharply dissected from the underlying rectus muscle bellies.  The peritoneum was tented and incised with Metzenbaum scissors.  After this was performed, the peritoneal incision was extended superiorly and inferiorly avoiding the bladder.  Bladder blade was placed.  Vesicouterine peritoneum overlying the lower uterine segment was incised with a Metzenbaum scissors and the incision was extended bilaterally and the bladder flap was created with sharp dissection.  The bladder blade was placed to retract the bladder flap out of harm's way.    The lower uterine segment was then scored transversely with a knife superficially and then the incision was carried down in the central portion of the score line until the amniotic space was reached.  Clear fluid was noted.  Blunt traction was applied in a craniocaudad manner, extended the incision along the score line.  The vertex was atraumatically delivered as it was lifted up through the incision with moderate fundal pressure and the oropharynx and nares were bulb suctioned on the operative field.  The rest of the delivery occurred atraumatically without difficulty and the infant began crying on the operative field.  A 3v cord was noted which was doubly clamped and cut and the infant was handed off to the NICU staff in attendance.  Cord blood was obtained.  The patient received IV Pitocin.  She received Ancef preoperatively.  Placenta was delivered spontaneously with gentle cord  traction and noted to be normal and intact.  The uterine cavity was cleared of all clots and debris.  The uterine incision was closed using a running locking #0 vicryl suture and then followed by an imbricating suture of #0 vicryl.  A few figure of eight sutures were used as well.  The uterine incision was hemostatic at this point.      The pericolic gutters were copiously irrigated with warm normal saline and cleared of any clots and debris.  Reinspection of all operative sites confirmed good hemostasis.    The rectus muscle bellies and the subfascial space were copiously irrigated with warm normal saline and any remaining bleeders were cauterized.  The anterior rectus fascia was closed using running 0 PDS suture.  The subcutaneous layer was copiously irrigated with warm normal saline and any remaining bleeders were cauterized.  Subcutaneous layer was reapproximated using running 2-0 Monocryl sutures to close the dead space.  Skin was closed using running 4-0 Vicryl subcuticular suture.  Steri-Strips were placed.    The patient tolerated the procedure well.  Sponge, instrument and needle counts were correct x3.  The patient was taken to the recovery room in stable condition.    Kayla Merrill DO

## 2018-08-11 LAB — HGB BLD-MCNC: 9.6 G/DL (ref 11.7–15.7)

## 2018-08-11 PROCEDURE — 85018 HEMOGLOBIN: CPT | Performed by: OBSTETRICS & GYNECOLOGY

## 2018-08-11 PROCEDURE — 25000132 ZZH RX MED GY IP 250 OP 250 PS 637: Performed by: OBSTETRICS & GYNECOLOGY

## 2018-08-11 PROCEDURE — 12000027 ZZH R&B OB

## 2018-08-11 PROCEDURE — 25000128 H RX IP 250 OP 636: Performed by: OBSTETRICS & GYNECOLOGY

## 2018-08-11 PROCEDURE — 36415 COLL VENOUS BLD VENIPUNCTURE: CPT | Performed by: OBSTETRICS & GYNECOLOGY

## 2018-08-11 RX ADMIN — SENNOSIDES AND DOCUSATE SODIUM 2 TABLET: 8.6; 5 TABLET ORAL at 18:18

## 2018-08-11 RX ADMIN — OXYCODONE HYDROCHLORIDE 10 MG: 5 TABLET ORAL at 07:18

## 2018-08-11 RX ADMIN — OXYCODONE HYDROCHLORIDE 10 MG: 5 TABLET ORAL at 16:37

## 2018-08-11 RX ADMIN — PRENATAL VIT W/ FE FUMARATE-FA TAB 27-0.8 MG 1 TABLET: 27-0.8 TAB at 18:18

## 2018-08-11 RX ADMIN — IBUPROFEN 800 MG: 800 TABLET, FILM COATED ORAL at 15:19

## 2018-08-11 RX ADMIN — OXYCODONE HYDROCHLORIDE 10 MG: 5 TABLET ORAL at 03:58

## 2018-08-11 RX ADMIN — ACETAMINOPHEN 975 MG: 325 TABLET, FILM COATED ORAL at 02:06

## 2018-08-11 RX ADMIN — OXYCODONE HYDROCHLORIDE 10 MG: 5 TABLET ORAL at 13:14

## 2018-08-11 RX ADMIN — OXYCODONE HYDROCHLORIDE 10 MG: 5 TABLET ORAL at 23:42

## 2018-08-11 RX ADMIN — KETOROLAC TROMETHAMINE 30 MG: 30 INJECTION, SOLUTION INTRAMUSCULAR at 02:06

## 2018-08-11 RX ADMIN — OXYCODONE HYDROCHLORIDE 10 MG: 5 TABLET ORAL at 19:30

## 2018-08-11 RX ADMIN — KETOROLAC TROMETHAMINE 30 MG: 30 INJECTION, SOLUTION INTRAMUSCULAR at 07:45

## 2018-08-11 RX ADMIN — SIMETHICONE CHEW TAB 80 MG 80 MG: 80 TABLET ORAL at 23:42

## 2018-08-11 RX ADMIN — Medication 1 LOZENGE: at 07:45

## 2018-08-11 RX ADMIN — SIMETHICONE CHEW TAB 80 MG 80 MG: 80 TABLET ORAL at 19:30

## 2018-08-11 RX ADMIN — ACETAMINOPHEN 975 MG: 325 TABLET, FILM COATED ORAL at 18:17

## 2018-08-11 RX ADMIN — IBUPROFEN 800 MG: 800 TABLET, FILM COATED ORAL at 21:23

## 2018-08-11 RX ADMIN — ACETAMINOPHEN 975 MG: 325 TABLET, FILM COATED ORAL at 11:05

## 2018-08-11 NOTE — PLAN OF CARE
Problem: Patient Care Overview  Goal: Plan of Care/Patient Progress Review  Outcome: Improving  Patient vital signs stable.  Pumping while baby in NICU.  Pumped x1 tonight, 5 mL of EBM.  Pain controlled with tylenol x1, toradol x1, and oxycodone x2.  Benadryl given for itching x1.  Crockett draining large amounts of urine overnight.  Crockett out at 0540.  Patient up to the bathroom with stand by assist. Walked to NICU with stand by assist at 0615.  Sister is present and supportive in room.

## 2018-08-11 NOTE — PROGRESS NOTES
"[facility] Postop C/S Note    S:  Patient without complaints other than typical soreness.  Minimal lochia.  Flatus not passed, tolerating Clear liquids diet well.    O:  Blood pressure 105/62, pulse 62, temperature 98.2  F (36.8  C), temperature source Oral, resp. rate 18, height 1.676 m (5' 6\"), weight 65.8 kg (145 lb), SpO2 100 %, unknown if currently breastfeeding.          Intake/Output Summary (Last 24 hours) at 08/11/18 0752  Last data filed at 08/11/18 0545   Gross per 24 hour   Intake             2592 ml   Output             3025 ml   Net             -433 ml           Lungs - CTA Bilat        Abdomen - Non-distended, Normoactive bowel sounds, soft, appropriately tender; Fundus firm, at umbilicus, nontender        Dressing/Incision - clean, dry, intact        Extremities - No calf tenderness    Hemoglobin   Date Value Ref Range Status   08/09/2018 11.2 (L) 11.7 - 15.7 g/dL Final   05/04/2018 11.5 (L) 11.7 - 15.7 g/dL Final   ]      A:   Postop Day# 1, s/p Repeat LTCS - doing well    P:  1)  Routine care        2)  Crockett out, ambulate, adv diet        3)  Hgb pending        4)  Probable D/C in 2 days      Tan Moss MD            "

## 2018-08-11 NOTE — PROGRESS NOTES
Patient complaining of wheezing early in shift. Dr. Moss wants patient to do incentive spirometer and walk halls 4 times a days. Wheezing has since resolved and patient states she feels better. Patient using tylenol and oxycodone for pain. Voiding without difficulty. Down to NICU once this shift. Patient declines pumping this shift, writer has encouraged her to pump several times today. Education taught to patient and patient verbalized understanding.

## 2018-08-11 NOTE — PLAN OF CARE
Problem: Patient Care Overview  Goal: Plan of Care/Patient Progress Review    Pt is breastpumping .   Pt out of bed and ambulated in room and wheeled to NICU by Unique Adler RN.

## 2018-08-11 NOTE — PLAN OF CARE
Problem: Patient Care Overview  Goal: Plan of Care/Patient Progress Review  Lung sounds clear and equal .  Pt is using incentive spirometer. Encouraged to walk on hallway. Taking ibuprofen, tylenol, oxycodone for pain. Pt using abdominal binder. T pump ordered for incisional pain.

## 2018-08-12 PROCEDURE — 12000027 ZZH R&B OB

## 2018-08-12 PROCEDURE — 25000132 ZZH RX MED GY IP 250 OP 250 PS 637: Performed by: OBSTETRICS & GYNECOLOGY

## 2018-08-12 RX ADMIN — OXYCODONE HYDROCHLORIDE 10 MG: 5 TABLET ORAL at 03:02

## 2018-08-12 RX ADMIN — IBUPROFEN 800 MG: 800 TABLET, FILM COATED ORAL at 21:18

## 2018-08-12 RX ADMIN — OXYCODONE HYDROCHLORIDE 5 MG: 5 TABLET ORAL at 06:44

## 2018-08-12 RX ADMIN — SENNOSIDES AND DOCUSATE SODIUM 2 TABLET: 8.6; 5 TABLET ORAL at 19:42

## 2018-08-12 RX ADMIN — OXYCODONE HYDROCHLORIDE 10 MG: 5 TABLET ORAL at 22:05

## 2018-08-12 RX ADMIN — ACETAMINOPHEN 975 MG: 325 TABLET, FILM COATED ORAL at 15:44

## 2018-08-12 RX ADMIN — SIMETHICONE CHEW TAB 80 MG 80 MG: 80 TABLET ORAL at 15:44

## 2018-08-12 RX ADMIN — ACETAMINOPHEN 975 MG: 325 TABLET, FILM COATED ORAL at 03:02

## 2018-08-12 RX ADMIN — IBUPROFEN 800 MG: 800 TABLET, FILM COATED ORAL at 15:40

## 2018-08-12 RX ADMIN — SENNOSIDES AND DOCUSATE SODIUM 1 TABLET: 8.6; 5 TABLET ORAL at 09:48

## 2018-08-12 RX ADMIN — IBUPROFEN 800 MG: 800 TABLET, FILM COATED ORAL at 03:02

## 2018-08-12 RX ADMIN — PRENATAL VIT W/ FE FUMARATE-FA TAB 27-0.8 MG 1 TABLET: 27-0.8 TAB at 19:42

## 2018-08-12 RX ADMIN — ACETAMINOPHEN 975 MG: 325 TABLET, FILM COATED ORAL at 23:39

## 2018-08-12 RX ADMIN — IBUPROFEN 800 MG: 800 TABLET, FILM COATED ORAL at 09:48

## 2018-08-12 RX ADMIN — SIMETHICONE CHEW TAB 80 MG 80 MG: 80 TABLET ORAL at 22:05

## 2018-08-12 NOTE — PROGRESS NOTES
[facility] Post-Op / Progress Note    Interval History   Doing well.  Pain is well-controlled.  No fevers.  No history of wound drainage, warmth or significant erythema.  Good appetite.  Denies chest pain, shortness of breath, nausea or vomiting.  Ambulatory.  She is pumping as baby is in NICU.  She has been done to the NICU several times.     Medications     dextrose 5% lactated ringers 125 mL/hr at 08/10/18 1523     - MEDICATION INSTRUCTIONS -       - MEDICATION INSTRUCTIONS -       NO Rho (D) immune globulin (RhoGam) needed - mother Rh POSITIVE       - MEDICATION INSTRUCTIONS -       - MEDICATION INSTRUCTIONS -       oxytocin in 0.9% NaCl 100 mL/hr (08/10/18 0952)     oxytocin in 0.9% NaCl         acetaminophen  975 mg Oral Q8H     prenatal multivitamin plus iron  1 tablet Oral Daily     sodium chloride (PF)  3 mL Intracatheter Q8H       Physical Exam   Temp: 98  F (36.7  C) Temp src: Oral BP: 114/74 Pulse: 79   Resp: 18 SpO2: 100 %      Vitals:    08/10/18 0611   Weight: 65.8 kg (145 lb)     Vital Signs with Ranges  Temp:  [97.8  F (36.6  C)-98  F (36.7  C)] 98  F (36.7  C)  Pulse:  [71-79] 79  Resp:  [18] 18  BP: (100-114)/(57-76) 114/74  SpO2:  [100 %] 100 %  I/O last 3 completed shifts:  In: -   Out: 1325 [Urine:1325]    Uterine fundus is firm, non-tender and at the level of the umbilicus  Incision C/D/I  Extremities Non-tender    Data   Recent Labs   Lab Test  18   1319   ABO  A   RH  Pos   AS  Neg     Recent Labs   Lab Test  18   0736  18   1319   HGB  9.6*  11.2*     Recent Labs   Lab Test 18   RUQIGG  Immune       Assessment & Plan   -2 Days Post-Op Procedure(s):  Repeat  Section - Wound Class: II-Clean Contaminated    Doing well.  Clean wound without signs of infection.  Normal healing wound.  No excessive bleeding  Pain well-controlled.  Ambulation encouraged  Monitor wound for signs of infection  Pain control measures as needed  Reportable signs  and symptoms dicussed with the patient  Anticipate discharge tomorrow    Maia Hall, DO

## 2018-08-12 NOTE — PROGRESS NOTES
Patient meeting expected goals this shift. Patient able to void without difficulty. Patient needing stand by assist to bathroom and NICU today. Using ibuprofen for pain. Patient states gas pains are getting better. Using abdominal binder for comfort. Education taught to patient and patient verbalized understanding. Plan to discharge tomorrow.

## 2018-08-12 NOTE — PLAN OF CARE
Problem: Patient Care Overview  Goal: Plan of Care/Patient Progress Review  Outcome: No Change  Patient vital signs stable.  Pumped x1 tonight, 6 mL.  Patient continues to rate pain at 8/10.  Tylenol x1, ibuprofen x1, and oxycodone x2 given during shift.  Patient also using heating pad and simethicone to help with gas pain.  Patient reports passing gas this shift.  Up with stand by assist, voiding adequately.  Patient reported nausea at 0330, relieved by crackers and water.  Patient sleeping/resting most of shift.

## 2018-08-12 NOTE — PLAN OF CARE
Problem: Patient Care Overview  Goal: Plan of Care/Patient Progress Review    Pt assisted to bathroom and encouraged to walk. Pt ambulated on hallway and wheeled to NICU to see baby. Pt breastpump x 1 this evening.

## 2018-08-12 NOTE — PLAN OF CARE
Problem: Patient Care Overview  Goal: Plan of Care/Patient Progress Review  Outcome: Improving    Pt up ad kenna in the room. Has abdominal binder, heating pad for incisional discomfort. Tylenol and Ibuprofen given orally. Pt has no BM yet and has gas pains. Simethicone given.     Instructed to fill out birth certificate, depression scale and encouraged to watch DVD. Discussed Bed and Board  If baby will be not be discharged from NICU tomorrow.

## 2018-08-13 ENCOUNTER — LACTATION ENCOUNTER (OUTPATIENT)
Age: 28
End: 2018-08-13

## 2018-08-13 VITALS
WEIGHT: 145 LBS | RESPIRATION RATE: 16 BRPM | OXYGEN SATURATION: 99 % | DIASTOLIC BLOOD PRESSURE: 70 MMHG | HEIGHT: 66 IN | SYSTOLIC BLOOD PRESSURE: 118 MMHG | HEART RATE: 99 BPM | BODY MASS INDEX: 23.3 KG/M2 | TEMPERATURE: 97.9 F

## 2018-08-13 PROBLEM — D64.9 POSTOPERATIVE ANEMIA: Status: ACTIVE | Noted: 2018-08-13

## 2018-08-13 PROBLEM — Z36.89 ENCOUNTER FOR TRIAGE IN PREGNANT PATIENT: Status: RESOLVED | Noted: 2018-06-16 | Resolved: 2018-08-13

## 2018-08-13 PROCEDURE — 25000132 ZZH RX MED GY IP 250 OP 250 PS 637: Performed by: OBSTETRICS & GYNECOLOGY

## 2018-08-13 RX ORDER — IBUPROFEN 600 MG/1
600 TABLET, FILM COATED ORAL EVERY 6 HOURS PRN
Qty: 30 TABLET | Refills: 0 | Status: SHIPPED | OUTPATIENT
Start: 2018-08-13

## 2018-08-13 RX ORDER — OXYCODONE HYDROCHLORIDE 5 MG/1
5 TABLET ORAL EVERY 6 HOURS PRN
Qty: 10 TABLET | Refills: 0 | Status: SHIPPED | OUTPATIENT
Start: 2018-08-13

## 2018-08-13 RX ADMIN — SENNOSIDES AND DOCUSATE SODIUM 1 TABLET: 8.6; 5 TABLET ORAL at 08:25

## 2018-08-13 RX ADMIN — OXYCODONE HYDROCHLORIDE 10 MG: 5 TABLET ORAL at 05:00

## 2018-08-13 RX ADMIN — OXYCODONE HYDROCHLORIDE 5 MG: 5 TABLET ORAL at 08:25

## 2018-08-13 RX ADMIN — ACETAMINOPHEN 975 MG: 325 TABLET, FILM COATED ORAL at 08:24

## 2018-08-13 RX ADMIN — IBUPROFEN 800 MG: 800 TABLET, FILM COATED ORAL at 03:47

## 2018-08-13 NOTE — DISCHARGE SUMMARY
"Discharge Summary        2018     Carmen Rousseau MRN# [id number]   YOB: 1990 Age: 28 year old     Date of Admission:  8/10/2018  Date of Discharge:  2018  Admitting Physician:  Kayla Merrill DO  Discharge Physician:             Tan Moss MD  Discharging Service:  Obstetrics & Gynecology    Home clinic: [facility]  Primary Provider: [facility], [place]          Admission Diagnoses:   Repeat, Classical Incision  S/P  section          Discharge Diagnosis:   Patient Active Problem List   Diagnosis     History of  section, classical      delivery delivered  Postoperative anemia                Discharge Disposition:    Discharged to home           Condition on Discharge:   Discharge condition: Stable    Discharge vitals: Blood pressure 104/68, pulse 86, temperature 98  F (36.7  C), temperature source Oral, resp. rate 16, height 1.676 m (5' 6\"), weight 65.8 kg (145 lb), SpO2 99 %, unknown if currently breastfeeding.   Code status on discharge: Full Code           Procedures / Labs / Imaging:   Procedures:   All laboratory data reviewed.  Imaging: N/A          Medications Prior to Admission:     Prescriptions Prior to Admission   Medication Sig Dispense Refill Last Dose     Prenatal Vit-Fe Fumarate-FA (PRENATAL MULTIVITAMIN PLUS IRON) 27-0.8 MG TABS per tablet Take 1 tablet by mouth daily   6/15/2018 at Unknown time     [DISCONTINUED] ASPIRIN PO Take 81 mg by mouth daily   2018             Discharge Medications:     Current Discharge Medication List      START taking these medications    Details   ibuprofen (ADVIL/MOTRIN) 600 MG tablet Take 1 tablet (600 mg) by mouth every 6 hours as needed for pain Take w/ food  Qty: 30 tablet, Refills: 0    Associated Diagnoses: Postoperative pain      oxyCODONE IR (ROXICODONE) 5 MG tablet Take 1 tablet (5 mg) by mouth every 6 hours as needed for breakthrough pain or other (pain control or improvement in " "physical function. Hold dose for analgesic side effects.)  Qty: 10 tablet, Refills: 0    Associated Diagnoses: Postoperative pain         CONTINUE these medications which have NOT CHANGED    Details   Prenatal Vit-Fe Fumarate-FA (PRENATAL MULTIVITAMIN PLUS IRON) 27-0.8 MG TABS per tablet Take 1 tablet by mouth daily         STOP taking these medications       ASPIRIN PO Comments:   Reason for Stopping:                     Consultations:   No consultations were requested during this admission             Brief History of Illness:   This patient was a 28 year old pregnant female,  4, para 0303, who presented with intrauterine pregnancy of gestational age  36w6d. She has a hx of a classical  section at 27 weeks followed by  demise in the past.  [facility] has recommended RLTCS between 36-37 weeks to avoid risk of uterine rupture with previous classical incision.  Prenatal care has been complicated by enlarged thyroid, s/p Endocrine consult with normal labs and needs follow-up postpartum, shortened cervix followed on US by [facility], hx of pre-eclampsia on daily ASA  which was stopped at 36 weeks, EIF noted on level 2 US and declined further testing.          Hospital Course:   Patient was admitted to the OR as a same-day admission and underwent a  section as noted in the separate operative report.  Her postop course was unremarkable.  She had good urine output for the first 24 hours and had her Crockett catheter removed on POD#1.  She was tolerating clear liquids and began ambulating on POD#1.  By POD#2 she was passing flatus and tolerating regular diet well.  By POD#3 she was ready for D/C home.  Her incision remained clean, dry, and intact without erythema or induration.  She was D/C'd home POD#3 with instructions to f/u in 6 weeks.          Physical Exam:    O:  Blood pressure 104/68, pulse 86, temperature 98  F (36.7  C), temperature source Oral, resp. rate 16, height 1.676 m (5' 6\"), weight 65.8 kg " (145 lb), SpO2 99 %, unknown if currently breastfeeding.        No intake or output data in the 24 hours ending 08/13/18 0749        Abdomen - Non-distended, Normoactive bowel sounds, soft, appropriately tender; Fundus firm, at umbilicus, nontender        Dressing/Incision - clean, dry, intact        Extremities - No calf tenderness           Data:  Hemoglobin   Date Value Ref Range Status   08/11/2018 9.6 (L) 11.7 - 15.7 g/dL Final   08/09/2018 11.2 (L) 11.7 - 15.7 g/dL Final     No results found for: [id number]   Lab Results   Component Value Date    ABO A 08/09/2018           Lab Results   Component Value Date    RH Pos 08/09/2018               Discharge Instructions and Follow-Up:   Discharge diet: Regular   Discharge activity: Activity as tolerated   Discharge follow-up: Follow up with [facility] in 6 weeks, Endocrine 4-6 weeks.   Outpatient therapy: None   Home Care agency: None   Supplies and equipment: Breast Pump   Lines and drains: None   Wound care: Keep Dry, Wash with soap and water, protect from sunlight, do not soak in water for 14 days (Swimming, Baths), but may shower.  Please call if wound becomes red, swollen, hot or begins draining pus like fluid.   Other instructions: None     Tan Moss MD

## 2018-08-13 NOTE — LACTATION NOTE
This note was copied from a baby's chart.  As LC, assisting with breast feeding. Initially sleepy, but is aroused and latches easily. Suck is appropriate and swallowing noted. After short sucking burst, babe needs rest period and is able to return to breast for another short sucking burst before fatigued and no longer interested. [place] had not been pre weighed.   Nasteho reports pumping and milk volume is <30 ml combined. Reinforced pumping after each feeding. Will continue to follow and support.

## 2018-08-13 NOTE — DISCHARGE INSTRUCTIONS
Postop  Birth Instructions    Follow up in clinic at 6 weeks post partum    Lactation [phone]      Activity       Do not lift more than 10 pounds for 6 weeks after surgery.  Ask family and friends for help when you need it.    No driving until you have stopped taking your pain medications (usually two weeks after surgery).    No heavy exercise or activity for 6 weeks.  Don't do anything that will put a strain on your surgery site.    Don't strain when using the toilet.  Your care team may prescribe a stool softener if you have problems with your bowel movements.     To care for your incision:       Keep the incision clean and dry.    Do not soak your incision in water. No swimming or hot tubs until it has fully healed. You may soak in the bathtub if the water level is below your incision.    Do not use peroxide, gel, cream, lotion, or ointment on your incision.    Adjust your clothes to avoid pressure on your surgery site (check the elastic in your underwear for example).     You may see a small amount of clear or pink drainage and this is normal.  Check with your health care provider:       If the drainage increases or has an odor.    If the incision reddens, you have swelling, or develop a rash.    If you have increased pain and the medicine we prescribed doesn't help.    If you have a fever above 100.4 F (38 C) with or without chills when placing thermometer under your tongue.   The area around your incision (surgery wound), will feel numb.  This is normal. The numbness should go away in less than a year.     Keep your hands clean:  Always wash your hands before touching your incision (surgery wound). This helps reduce your risk of infection. If your hands aren't dirty, you may use an alcohol hand-rub to clean your hands. Keep your nails clean and short.    Call your healthcare provider if you have any of these symptoms:       You soak a sanitary pad with blood within 1 hour, or you see blood clots  larger than a golf ball.    Bleeding that lasts more than 6 weeks.    Vaginal discharge that smells bad.    Severe pain, cramping or tenderness in your lower belly area.    A need to urinate more frequently (use the toilet more often), more urgently (use the toilet very quickly), or it burns when you urinate.    Nausea and vomiting.    Redness, swelling or pain around a vein in your leg.    Problems breastfeeding or a red or painful area on your breast.    Chest pain and cough or are gasping for air.    Problems with coping with sadness, anxiety or depression. If you have concerns about hurting yourself or the baby, call your provider immediately.      You have questions or concerns after you return home.

## 2018-08-13 NOTE — PLAN OF CARE
Problem: Patient Care Overview  Goal: Plan of Care/Patient Progress Review  Patient stable, able to sleep overnight. Pain managed with Ibuprofen, Tylenol, and Oxycodone. Pumping X1, EBM sent to NICU. Patient plans to go to NICU in AM and breastfeed .

## 2018-08-13 NOTE — PROGRESS NOTES
Data: Vital signs within normal limits. Postpartum checks within normal limits - see flow record. Patient eating and drinking normally. Patient able to empty bladder independently and is up ambulating. No apparent signs of infection.  Patient performing self cares and is able to care for infant.  Action: Patient medicated during the shift for pain with tylenol and oxycodone. See MAR. Patient reassessed within 1 hour after each medication and pain was improved - patient stated she was comfortable. Patient education complete and patient verbalized understanding. See flow record.  Response: Positive attachment behaviors observed with infant.   Plan: Anticipate discharge on 1025. AVS read to patient, all questions and concerns addressed. Patient given breast pump and number for lactation. Home care referral sent. Patient to follow up with clinic in 6 weeks. Patient educated on signs and symptoms of infection, when to call the doctor, and medications. Patient to remain bed and board until infant is discharged from NICU.

## 2018-08-15 ENCOUNTER — LACTATION ENCOUNTER (OUTPATIENT)
Age: 28
End: 2018-08-15

## 2018-08-15 NOTE — LACTATION NOTE
This note was copied from a baby's chart.  As RONALDO spoke briefly with Carmen. She reports Simran is getting 16mL at breast as she was sleepy. She says her milk volume is increasing with pumping

## 2018-08-15 NOTE — LACTATION NOTE
This note was copied from a baby's chart.  As LC spoke with Carmen in the NICU.  We reviewed pumping strategies. She reports pumping but not at night (she is staying B&B). I stressed importance of getting more milk to support times when we feed Sadir when she is not here. We agreed she would pump x1 at night and I will follow up on Friday to make sure her milk volume continues to increase. Info shared with bedside PHOEBE Cooper.

## 2018-08-17 ENCOUNTER — LACTATION ENCOUNTER (OUTPATIENT)
Age: 28
End: 2018-08-17

## 2018-08-17 NOTE — LACTATION NOTE
This note was copied from a baby's chart.  As LC assisting with breast feeding. Tyler is actively sucking with long draws and audible swallowing. Her lack appears slightly shallow. Carmen moves her to opposite breast and reports she gets less milk on the left breast and she is noting Sadir working hard to get milk. Opposite breast Sadir latched easily. 52mL per scale. We discussed pumping strategies and Carmen reports fatigue and is not pumping as frequently. She desires that Tyler get her milk and not formula so will follow pumping more closely.  I gave her a log to monitor pumping times and volumes and will review with her over the weekend. Will continue to follow and support.

## 2021-06-05 ENCOUNTER — APPOINTMENT (OUTPATIENT)
Dept: ULTRASOUND IMAGING | Facility: CLINIC | Age: 31
End: 2021-06-05
Attending: EMERGENCY MEDICINE
Payer: COMMERCIAL

## 2021-06-05 ENCOUNTER — HOSPITAL ENCOUNTER (EMERGENCY)
Facility: CLINIC | Age: 31
Discharge: HOME | End: 2021-06-05
Attending: EMERGENCY MEDICINE | Admitting: EMERGENCY MEDICINE
Payer: COMMERCIAL

## 2021-06-05 VITALS
OXYGEN SATURATION: 98 % | DIASTOLIC BLOOD PRESSURE: 85 MMHG | RESPIRATION RATE: 14 BRPM | BODY MASS INDEX: 27.32 KG/M2 | HEIGHT: 66 IN | WEIGHT: 170 LBS | HEART RATE: 74 BPM | SYSTOLIC BLOOD PRESSURE: 119 MMHG | TEMPERATURE: 97.4 F

## 2021-06-05 DIAGNOSIS — N93.9 VAGINAL BLEEDING: ICD-10-CM

## 2021-06-05 DIAGNOSIS — R10.2 PELVIC PAIN IN FEMALE: ICD-10-CM

## 2021-06-05 LAB
ABO + RH BLD: NORMAL
ABO + RH BLD: NORMAL
ALBUMIN UR-MCNC: NEGATIVE MG/DL
ANION GAP SERPL CALCULATED.3IONS-SCNC: 2 MMOL/L (ref 3–14)
APPEARANCE UR: CLEAR
BACTERIA #/AREA URNS HPF: ABNORMAL /HPF
BASOPHILS # BLD AUTO: 0 10E9/L (ref 0–0.2)
BASOPHILS NFR BLD AUTO: 0.5 %
BILIRUB UR QL STRIP: NEGATIVE
BLD GP AB SCN SERPL QL: NORMAL
BLOOD BANK CMNT PATIENT-IMP: NORMAL
BUN SERPL-MCNC: 9 MG/DL (ref 7–30)
CALCIUM SERPL-MCNC: 8.4 MG/DL (ref 8.5–10.1)
CHLORIDE SERPL-SCNC: 109 MMOL/L (ref 94–109)
CO2 SERPL-SCNC: 26 MMOL/L (ref 20–32)
COLOR UR AUTO: ABNORMAL
CREAT SERPL-MCNC: 0.68 MG/DL (ref 0.52–1.04)
DIFFERENTIAL METHOD BLD: ABNORMAL
EOSINOPHIL # BLD AUTO: 0.1 10E9/L (ref 0–0.7)
EOSINOPHIL NFR BLD AUTO: 2.5 %
ERYTHROCYTE [DISTWIDTH] IN BLOOD BY AUTOMATED COUNT: 15 % (ref 10–15)
GFR SERPL CREATININE-BSD FRML MDRD: >90 ML/MIN/{1.73_M2}
GLUCOSE SERPL-MCNC: 83 MG/DL (ref 70–99)
GLUCOSE UR STRIP-MCNC: NEGATIVE MG/DL
HCG SERPL QL: NEGATIVE
HCT VFR BLD AUTO: 34.4 % (ref 35–47)
HGB BLD-MCNC: 10.6 G/DL (ref 11.7–15.7)
HGB UR QL STRIP: ABNORMAL
IMM GRANULOCYTES # BLD: 0 10E9/L (ref 0–0.4)
IMM GRANULOCYTES NFR BLD: 0.3 %
KETONES UR STRIP-MCNC: NEGATIVE MG/DL
LEUKOCYTE ESTERASE UR QL STRIP: NEGATIVE
LYMPHOCYTES # BLD AUTO: 1.9 10E9/L (ref 0.8–5.3)
LYMPHOCYTES NFR BLD AUTO: 47.5 %
MCH RBC QN AUTO: 24.6 PG (ref 26.5–33)
MCHC RBC AUTO-ENTMCNC: 30.8 G/DL (ref 31.5–36.5)
MCV RBC AUTO: 80 FL (ref 78–100)
MONOCYTES # BLD AUTO: 0.4 10E9/L (ref 0–1.3)
MONOCYTES NFR BLD AUTO: 10.7 %
NEUTROPHILS # BLD AUTO: 1.5 10E9/L (ref 1.6–8.3)
NEUTROPHILS NFR BLD AUTO: 38.5 %
NITRATE UR QL: NEGATIVE
NRBC # BLD AUTO: 0 10*3/UL
NRBC BLD AUTO-RTO: 0 /100
PH UR STRIP: 6 PH (ref 5–7)
PLATELET # BLD AUTO: 320 10E9/L (ref 150–450)
POTASSIUM SERPL-SCNC: 3.8 MMOL/L (ref 3.4–5.3)
RBC # BLD AUTO: 4.31 10E12/L (ref 3.8–5.2)
RBC #/AREA URNS AUTO: 1 /HPF (ref 0–2)
SODIUM SERPL-SCNC: 137 MMOL/L (ref 133–144)
SOURCE: ABNORMAL
SP GR UR STRIP: 1 (ref 1–1.03)
SPECIMEN EXP DATE BLD: NORMAL
SPECIMEN SOURCE: NORMAL
UROBILINOGEN UR STRIP-MCNC: NORMAL MG/DL (ref 0–2)
WBC # BLD AUTO: 3.9 10E9/L (ref 4–11)
WBC #/AREA URNS AUTO: <1 /HPF (ref 0–5)
WET PREP SPEC: NORMAL

## 2021-06-05 PROCEDURE — 86850 RBC ANTIBODY SCREEN: CPT | Performed by: EMERGENCY MEDICINE

## 2021-06-05 PROCEDURE — 96374 THER/PROPH/DIAG INJ IV PUSH: CPT

## 2021-06-05 PROCEDURE — 87591 N.GONORRHOEAE DNA AMP PROB: CPT | Performed by: STUDENT IN AN ORGANIZED HEALTH CARE EDUCATION/TRAINING PROGRAM

## 2021-06-05 PROCEDURE — 84703 CHORIONIC GONADOTROPIN ASSAY: CPT | Performed by: EMERGENCY MEDICINE

## 2021-06-05 PROCEDURE — 85025 COMPLETE CBC W/AUTO DIFF WBC: CPT | Performed by: EMERGENCY MEDICINE

## 2021-06-05 PROCEDURE — 86901 BLOOD TYPING SEROLOGIC RH(D): CPT | Performed by: EMERGENCY MEDICINE

## 2021-06-05 PROCEDURE — 80048 BASIC METABOLIC PNL TOTAL CA: CPT | Performed by: EMERGENCY MEDICINE

## 2021-06-05 PROCEDURE — 81001 URINALYSIS AUTO W/SCOPE: CPT | Performed by: EMERGENCY MEDICINE

## 2021-06-05 PROCEDURE — 87210 SMEAR WET MOUNT SALINE/INK: CPT | Performed by: STUDENT IN AN ORGANIZED HEALTH CARE EDUCATION/TRAINING PROGRAM

## 2021-06-05 PROCEDURE — 86900 BLOOD TYPING SEROLOGIC ABO: CPT | Performed by: EMERGENCY MEDICINE

## 2021-06-05 PROCEDURE — 250N000013 HC RX MED GY IP 250 OP 250 PS 637: Performed by: EMERGENCY MEDICINE

## 2021-06-05 PROCEDURE — 87491 CHLMYD TRACH DNA AMP PROBE: CPT | Performed by: STUDENT IN AN ORGANIZED HEALTH CARE EDUCATION/TRAINING PROGRAM

## 2021-06-05 PROCEDURE — 250N000011 HC RX IP 250 OP 636: Performed by: EMERGENCY MEDICINE

## 2021-06-05 PROCEDURE — 76830 TRANSVAGINAL US NON-OB: CPT

## 2021-06-05 PROCEDURE — 99285 EMERGENCY DEPT VISIT HI MDM: CPT | Mod: 25

## 2021-06-05 PROCEDURE — 87110 CHLAMYDIA CULTURE: CPT | Performed by: STUDENT IN AN ORGANIZED HEALTH CARE EDUCATION/TRAINING PROGRAM

## 2021-06-05 RX ORDER — OXYCODONE HYDROCHLORIDE 5 MG/1
5 TABLET ORAL ONCE
Status: DISCONTINUED | OUTPATIENT
Start: 2021-06-05 | End: 2021-06-05 | Stop reason: HOSPADM

## 2021-06-05 RX ORDER — KETOROLAC TROMETHAMINE 15 MG/ML
15 INJECTION, SOLUTION INTRAMUSCULAR; INTRAVENOUS ONCE
Status: COMPLETED | OUTPATIENT
Start: 2021-06-05 | End: 2021-06-05

## 2021-06-05 RX ORDER — ACETAMINOPHEN 325 MG/1
975 TABLET ORAL ONCE
Status: COMPLETED | OUTPATIENT
Start: 2021-06-05 | End: 2021-06-05

## 2021-06-05 RX ADMIN — ACETAMINOPHEN 975 MG: 325 TABLET, FILM COATED ORAL at 14:27

## 2021-06-05 RX ADMIN — KETOROLAC TROMETHAMINE 15 MG: 15 INJECTION, SOLUTION INTRAMUSCULAR; INTRAVENOUS at 14:29

## 2021-06-05 ASSESSMENT — ENCOUNTER SYMPTOMS
FEVER: 0
NAUSEA: 0
NUMBNESS: 1
BACK PAIN: 1
SHORTNESS OF BREATH: 0
CHILLS: 0
VOMITING: 0
ABDOMINAL PAIN: 1
COUGH: 0
HEMATURIA: 1

## 2021-06-05 ASSESSMENT — MIFFLIN-ST. JEOR: SCORE: 1502.86

## 2021-06-05 NOTE — ED PROVIDER NOTES
History   Chief Complaint:  Vaginal Bleeding     A  was used (Albanian).      Carmen Rousseau is a 31 year old female with history of recurrent UTIs who presents with abnormal vaginal bleeding. Two to three days ago, the patient began to develop severe lower pelvic and abdominal pain along with abnormal vaginal bleeding. The pain has continued to worsen and the patient reports that her entire abdomen now hurts making it difficult to sit for long periods of time. Additionally, she reports having numbness over the area where she had her  incision as well as lower back pain. She has been taking 600 mg of Advil since yesterday, but it has not helped to relieve her pain which prompted her visit to the ED today. The patient reports some light blood with urination and her last period was five days ago and appeared normal. The patient denies cough, chest pain, shortness of breath, fever, chills, nausea, or vomiting.     Review of Systems   Constitutional: Negative for chills and fever.   Respiratory: Negative for cough and shortness of breath.    Cardiovascular: Negative for chest pain.   Gastrointestinal: Positive for abdominal pain. Negative for nausea and vomiting.   Genitourinary: Positive for hematuria, pelvic pain and vaginal bleeding.   Musculoskeletal: Positive for back pain.   Neurological: Positive for numbness.   All other systems reviewed and are negative.    Allergies:  The patient has no known allergies.     Medications:  Oxycodone    Past Medical History:    Postoperative anemia  Preeclampsia  Premature rupture of membranes  Enlarged thyroid  Recurrent UTI  Vaginal yeast infection   delivery    Past Surgical History:    ENT surgery  Dental surgery    section x3     Social History:  The patient presents alone.     Physical Exam     Patient Vitals for the past 24 hrs:   BP Temp Temp src Pulse Resp SpO2 Height Weight   21 1523 -- -- -- -- -- 100 % -- --  "  06/05/21 1445 -- -- -- -- -- 100 % -- --   06/05/21 1430 115/80 -- -- 74 -- -- -- --   06/05/21 1300 114/78 -- -- 73 -- 100 % -- --   06/05/21 1250 114/75 -- -- 71 -- 100 % -- --   06/05/21 1146 118/78 97.4  F (36.3  C) Temporal 88 14 100 % 1.676 m (5' 6\") 77.1 kg (170 lb)       Physical Exam  General: Patient is awake, alert and interactive when I enter the room, appears uncomfortable.   Eyes: Conjunctivae and sclerae are normal  Neck: Normal range of motion. No anterior cervical lymphadenopathy noted  CV: Regular rate.  Resp: Lungs are clear without wheezes or rales. No respiratory distress.   GI: Abdomen is soft, no rigidity, guarding, or rebound. No distension. No tenderness to palpation in any quadrant.    : Performed by Dr HILTON Sood at patient's request. External exam: no lesions, no erythema/cellulitis  Internal exam: S/p infibulation with partial take down. Scar tissue mobile, no adhesions appreciated. No evidence of erythema, ecchymosis or lesions.   Cervical open to about 2 cm. No evidence of friable tissue.   No vaginal discharge noted. No gross pus from cervical os noted.    Dark red clot, dark red blood noted easily removed with swabs with no fresh bleeding during exam.  Bimanual exam:   Tender to palpation over L adnexal area other wise negative.   negative Cervical motion tenderness.   No Pain to palpation of uterus/bladder:   MS: moving all extremities.   Skin: No rash or lesions noted. Normal capillary refill noted  Neuro:Speech is normal and fluent. Face is symmetric. Moving all extremities.   Psych:  Normal affect.  Appropriate interactions.      Emergency Department Course     Imaging:  US Pelvis Cmplt W Transvag & Doppler LmtPEl Duplex Limited  1. Physiologic amount of free fluid in the pelvis.  2. Otherwise negative pelvic ultrasound. Etiology for patient's  symptoms is otherwise not definitely seen.  Reading per radiology.    Laboratory:  CBC: WBC 3.9 (L), HGB 10.6 (L),    BMP: " Anion Gap 2 (L), Calcium 8.4 (L) o/w WNL (Creatinine 0.68)     HCG Qualitative Blood: Negative    ABO RH Type and Screen: A positive, antibody negative     UA with microscopic: Blood moderate, Bacteria few o/w WNL      Emergency Department Course:    Reviewed:  I reviewed nursing notes, vitals and past medical history    Assessments:  1225 I obtained history and examined the patient as noted above.   1443 I rechecked the patient and explained findings.     Interventions:  1427 Tylenol 975 mg Oral  1429 Toradol 15 mg IV   1504 Roxicodone 5 mg Oral     Disposition:  The patient was discharged to home.       Impression & Plan     Medical Decision Making:  Patient is a 31-year-old female who presents to the emergency department with lower pelvic pain and vaginal bleeding. Upon initial evaluation she is hemodynamically stable with normal vital signs. She is afebrile. On physical exam she does have some bilateral lower pelvic pain but no significant overt guarding or rebound. Pelvic exam noted some mild left adnexal tenderness but no significant cervical motion tenderness or discharge. Pelvic ultrasound did not reveal any evidence of ovarian torsion or tubo-ovarian abscess. The remainder of the patient's work-up was reassuring. UA was negative without any signs of significant infection. She is not pregnant. Hemoglobin is stable. On repeat abdominal exam, she has a benign abdomen without any significant guarding or rebound. Symptoms significantly improved after Tylenol, Toradol and one dose of oxycodone. Her symptoms may be due to a prolonged period but I do not find any other significant sinister pathology at this time requiring admission. Appendicitis or additional surgical pathology was also considered however given her repeat abdominal exam was benign and I do not believe that we need to perform a CT scan at this point. However I educated the patient that if her pain significantly worsened over the next 24 to 48 hours  that she would need to return to the emergency department immediately for reevaluation.    Covid-19  Carmen Rousseau was evaluated during a global COVID-19 pandemic, which necessitated consideration that the patient might be at risk for infection with the SARS-CoV-2 virus that causes COVID-19.   Applicable protocols for evaluation were followed during the patient's care.     Diagnosis:    ICD-10-CM    1. Pelvic pain in female  R10.2    2. Vaginal bleeding  N93.9        Scribe Disclosure:  I, Frannie Barragan, am serving as a scribe at 12:07 PM on 6/5/2021 to document services personally performed by Leo Lobo based on my observations and the provider's statements to me.     I, Cale Duke, am serving as a scribe () on 6/5/2021 at 12:46 PM to personally document services performed by Leo Lobo* based on my observations and the provider's statements to me.            Leo Lobo MD  06/05/21 [id number]

## 2021-06-05 NOTE — ED NOTES
Patient alert and oriented. Respirations even and unlabored. All discharge education given. All questions answered. Patient denies further needs and states that they are ready to leave. Patient ambulated out of the ER with steady gait.

## 2021-06-05 NOTE — DISCHARGE INSTRUCTIONS
Take Tylenol and ibuprofen for pain and please return immediately with worsening pain, vomiting, fevers or any other concerning symptoms.

## 2021-06-05 NOTE — ED TRIAGE NOTES
Patient complaining of severe pelvic pain and vaginal bleeding since yesterday.      Seen at  and has negative pregnancy test.       Just finished regular menstrual cycle 5 days ago.      ABCs intact. Alert and oriented x 4.

## 2021-06-06 ENCOUNTER — TELEPHONE (OUTPATIENT)
Dept: EMERGENCY MEDICINE | Facility: CLINIC | Age: 31
End: 2021-06-06

## 2021-06-06 ENCOUNTER — APPOINTMENT (OUTPATIENT)
Dept: INTERPRETER SERVICES | Facility: CLINIC | Age: 31
End: 2021-06-06
Payer: COMMERCIAL

## 2021-06-06 LAB
C TRACH DNA SPEC QL NAA+PROBE: POSITIVE
N GONORRHOEA DNA SPEC QL NAA+PROBE: NEGATIVE
SPECIMEN SOURCE: ABNORMAL
SPECIMEN SOURCE: NORMAL

## 2021-06-06 RX ORDER — AZITHROMYCIN 500 MG/1
1000 TABLET, FILM COATED ORAL ONCE
Qty: 2 TABLET | Refills: 0 | Status: SHIPPED | OUTPATIENT
Start: 2021-06-06 | End: 2021-06-06

## 2021-06-06 RX ORDER — ONDANSETRON 4 MG/1
4 TABLET, ORALLY DISINTEGRATING ORAL ONCE
Qty: 1 TABLET | Refills: 0 | Status: SHIPPED | OUTPATIENT
Start: 2021-06-06 | End: 2021-06-06

## 2021-06-06 NOTE — TELEPHONE ENCOUNTER
[facility] Emergency Department/Urgent Care Lab result notification     Patient/parent Name  Carmen    RN Assessment (Patient s current Symptoms), include time called.  [Insert Left message here if message left]  11:55 Patient returned call to ED lab results, relayed positive Chlamydia and need for treatment.  Patient states she is okay.    RN Recommendations/Instructions per [place] ED lab result protocol  Patient notified of lab result and treatment recommendations.  Rx for Azithromycin 1000 mg PO x 1 and zofran 4 mg tablet x 1 [Pharmacy - [facility]].  RN reviewed information about STD Patient Instructions:    We recommend that you contact any recent sexual partners within the last 2 months and have them evaluated by a physician.    Avoid sexual activity for 7 to 10 days or until both you and your partner(s) have completed all antibiotic medications.    We advise that you consider following up with your PCP at approximately 3 months for retesting to be sure the infection has cleared.       Please Contact your PCP clinic or return to the Emergency department if your:    Symptoms return.    Symptoms do not improve after 3 days on antibiotic.    Symptoms do not resolve after completing antibiotic.    Symptoms worsen or other concerning symptom's.    Marie Zarate  [facility]  Emergency Dept Lab Result RN  Ph# [phone]

## 2021-06-06 NOTE — TELEPHONE ENCOUNTER
[facility] Emergency Department Lab result notification [Adult-Female]    [place] ED lab result protocol used  Chlamydia T. protocol    Reason for call  Notify of lab results, assess symptoms,  review ED providers recommendations/discharge instructions (if necessary) and advise per ED lab result f/u protocol    Lab Result (including Rx patient on, if applicable)  Final Chlamydia trachomatis PCR on 21 is POSITIVE for C. trachomatis rRNA by transcription mediated amplification.   Antibiotic's administered while Patient in the [facility] Emergency Dept [if applicable]:  None  [facility] ED discharge antibiotic[if applicable]: None  Recommendations in treatment per [facility] ED Lab result Chlamydia trachomatis protocol.  Information table from Emergency Dept Provider visit on 21  Symptoms reported at ED visit (Chief complaint, HPI) A  was used (Barb[place]).      Carmen Rousseau is a 31 year old female with history of recurrent UTIs who presents with abnormal vaginal bleeding. Two to three days ago, the patient began to develop severe lower pelvic and abdominal pain along with abnormal vaginal bleeding. The pain has continued to worsen and the patient reports that her entire abdomen now hurts making it difficult to sit for long periods of time. Additionally, she reports having numbness over the area where she had her  incision as well as lower back pain. She has been taking 600 mg of Advil since yesterday, but it has not helped to relieve her pain which prompted her visit to the ED today. The patient reports some light blood with urination and her last period was five days ago and appeared normal. The patient denies cough, chest pain, shortness of breath, fever, chills, nausea, or vomiting.       Significant Medical hx, if applicable (i.e. CKD, diabetes) Reviewed   Allergies No Known Allergies   Weight, if applicable Wt Readings from Last 2  Encounters:   06/05/21 77.1 kg (170 lb)   08/10/18 65.8 kg (145 lb)      Coumadin/Warfarin [Yes /No] No   Creatinine Level (mg/dl) Creatinine   Date Value Ref Range Status   06/05/2021 0.68 0.52 - 1.04 mg/dL Final      Creatinine clearance (ml/min), if applicable Serum creatinine: 0.68 mg/dL 06/05/21 1239  Estimated creatinine clearance: 125.7 mL/min   Pregnant (Yes/No/NA) HCG Qualitative Blood: Negative     Breastfeeding (Yes/No/NA) ?   ED providers Impression and Plan (applicable information) Patient is a 31-year-old female who presents to the emergency department with lower pelvic pain and vaginal bleeding. Upon initial evaluation she is hemodynamically stable with normal vital signs. She is afebrile. On physical exam she does have some bilateral lower pelvic pain but no significant overt guarding or rebound. Pelvic exam noted some mild left adnexal tenderness but no significant cervical motion tenderness or discharge. Pelvic ultrasound did not reveal any evidence of ovarian torsion or tubo-ovarian abscess. The remainder of the patient's work-up was reassuring. UA was negative without any signs of significant infection. She is not pregnant. Hemoglobin is stable. On repeat abdominal exam, she has a benign abdomen without any significant guarding or rebound. Symptoms significantly improved after Tylenol, Toradol and one dose of oxycodone. Her symptoms may be due to a prolonged period but I do not find any other significant sinister pathology at this time requiring admission. Appendicitis or additional surgical pathology was also considered however given her repeat abdominal exam was benign and I do not believe that we need to perform a CT scan at this point. However I educated the patient that if her pain significantly worsened over the next 24 to 48 hours that she would need to return to the emergency department immediately for reevaluation.   ED diagnosis  Pelvic pain in female     Vaginal bleeding     ED  provider Leo Lobo MD      RN Assessment (Patient s current Symptoms), include time called.  [Insert Left message here if message left]  11:29 via  [place]  Left voicemail message requesting a call back to [facility] ED Lab Result RN at [phone].  RN is available every day between 9 a.m. and 5:30 p.m.        Marie Zarate  [facility]  Emergency Dept Lab Result RN  Ph# [phone]     Copy of Lab result   Component      Latest Ref Rng & Units 6/5/2021   Specimen Descrip       Vagina   N Gonorrhea PCR      NEG:Negative Negative   Specimen Description       Vagina   Chlamydia Trachomatis PCR      NEG:Negative Positive (A)

## 2022-01-16 ENCOUNTER — HOSPITAL ENCOUNTER (EMERGENCY)
Facility: CLINIC | Age: 32
Discharge: HOME | End: 2022-01-16
Attending: PHYSICIAN ASSISTANT | Admitting: PHYSICIAN ASSISTANT
Payer: COMMERCIAL

## 2022-01-16 VITALS
HEART RATE: 99 BPM | TEMPERATURE: 98.6 F | DIASTOLIC BLOOD PRESSURE: 74 MMHG | RESPIRATION RATE: 16 BRPM | SYSTOLIC BLOOD PRESSURE: 112 MMHG | OXYGEN SATURATION: 100 %

## 2022-01-16 DIAGNOSIS — Z20.822 SUSPECTED 2019 NOVEL CORONAVIRUS INFECTION: ICD-10-CM

## 2022-01-16 LAB
FLUAV RNA SPEC QL NAA+PROBE: NEGATIVE
FLUBV RNA RESP QL NAA+PROBE: NEGATIVE
SARS-COV-2 RNA RESP QL NAA+PROBE: POSITIVE

## 2022-01-16 PROCEDURE — 99283 EMERGENCY DEPT VISIT LOW MDM: CPT

## 2022-01-16 PROCEDURE — C9803 HOPD COVID-19 SPEC COLLECT: HCPCS

## 2022-01-16 PROCEDURE — 87636 SARSCOV2 & INF A&B AMP PRB: CPT | Performed by: PHYSICIAN ASSISTANT

## 2022-01-16 ASSESSMENT — ENCOUNTER SYMPTOMS
HEADACHES: 1
SORE THROAT: 1
COUGH: 1

## 2022-01-16 NOTE — ED PROVIDER NOTES
History   Chief Complaint:  Cough       The history is provided by the patient.      Carmen Rousseau is a 32 year old female who presents with cough. Three nights ago, patient developed cough with associated chest tightness, headaches, and intermittent sore throat and congestion. Denies concern for pregnancy and sick contacts, but her daughter's schooling recently switched to distance learning due to an increase in COVID-19 cases.    Review of Systems   HENT: Positive for congestion and sore throat.    Respiratory: Positive for cough.    Cardiovascular: Positive for chest pain.   Neurological: Positive for headaches.   All other systems reviewed and are negative.      Allergies:  No Known Allergies    Medications:  None     Past Medical History:     Postoperative anemia  Preeclampsia   Premature rupture of membranes   Enlarged thyroid  UTI (urinary tract infection)   Vaginal yeast infection  Zika virus exposure affecting pregnancy  Anovulation    Past Surgical History:     section x3   Dental surgery   ENT surgery     Social History:  Presents with her daughter.  PCP: [facility], [place]    Physical Exam     Patient Vitals for the past 24 hrs:   BP Temp Temp src Pulse Resp SpO2   22 1410 112/74 98.6  F (37  C) Oral 99 16 100 %       Physical Exam  Constitutional: Pleasant. Cooperative.   Eyes: Pupils equally round and reactive  HENT: Head is normal in appearance. Moist mucous membranes. No oropharyngeal erythema. No exudates. No palatal asymmetry. Uvula midline. No trismus. No muffled voice. Tolerating their secretions.  Cardiovascular: Regular rate and rhythm.  Respiratory: Normal respiratory effort, lungs are clear bilaterally.  Neck: Normal ROM. No preauricular, postauricular, tonsillar, submandibular, submental, or cervical lymphadenopathy.   Skin: Normal, without rash.  Neurologic: Cranial nerves grossly intact. Alert.  Nursing notes and vital signs reviewed.    Emergency Department  Course     Laboratory:  Influenza A/B & SARS-CoV2 (COVID-19) Virus PCR Multiplex Nasopharyngeal: pending     Emergency Department Course:  Reviewed:  I reviewed nursing notes, vitals, past medical history and Care Everywhere    Assessments:  1435 I obtained history and examined the patient as noted above.     Disposition:  The patient was discharged to home.     Impression & Plan     Medical Decision Making:  Carmen Rousseau is a 32 year old female who presents to the ED for evaluaiton of cough, headaches, and other URI symptoms. See HPI as above for additional details. Vitals and physical exam as above. DDx was broad and included PNA including COVID, PTX, meningitis, viral URI, PTA, RPA, strep pharyngitis, among others. Lungs CTA. Oropharyngeal exam without evidence for erythema or swelling. Patient overall well appearing. Suspect COVID at this time based upon pandemic, symptoms, daughter who is in school has identical symptoms. COVID swab pending. Remainder of differential less likely. [place] patient was safe for discharge to home. Discussed reasons to return. All questions answered. Patient discharged to home in stable condition.     Diagnosis:    ICD-10-CM    1. Suspected 2019 novel coronavirus infection  Z20.822        Scribe Disclosure:  I, Adore Mathewnis, am serving as a scribe at 2:22 PM on 1/16/2022 to document services personally performed by Elvis Elliott PA-C based on my observations and the provider's statements to me.     This record was created at least in part using electronic voice recognition software, so please excuse any typographical errors.         Elvis Elliott PA-C  01/16/22 [id number]

## 2022-01-16 NOTE — DISCHARGE INSTRUCTIONS

## (undated) DEVICE — PACK C-SECTION LF PL15OTA83B

## (undated) DEVICE — SOL ADH LIQUID BENZOIN SWAB 0.6ML C1544

## (undated) DEVICE — GLOVE PROTEXIS MICRO 6.5  2D73PM65

## (undated) DEVICE — DRSG ABDOMINAL 07 1/2X8" 7197D

## (undated) DEVICE — DRSG ADAPTIC 3X8" 6113

## (undated) DEVICE — SOL WATER IRRIG 1000ML BOTTLE 2F7114

## (undated) DEVICE — SUCTION CANISTER MEDIVAC LINER 3000ML W/LID 65651-530

## (undated) DEVICE — ESU GROUND PAD ADULT W/CORD E7507

## (undated) DEVICE — SOL NACL 0.9% IRRIG 1000ML BOTTLE 2F7124

## (undated) DEVICE — SU MONOCRYL 2-0 CT-2 27" Y333H

## (undated) DEVICE — CAP BABY PINK/BLUE IC-2

## (undated) DEVICE — BLADE CLIPPER SGL USE 9680

## (undated) DEVICE — CATH TRAY FOLEY 16FR SILICONE 907416

## (undated) DEVICE — DRSG GAUZE 4X8"

## (undated) DEVICE — DRSG STERI STRIP 1/2X4" R1547

## (undated) DEVICE — SU VICRYL 0 CT-1 36" J346H

## (undated) DEVICE — DRSG GAUZE 4X4" 8044

## (undated) DEVICE — TRANSFER DEVICE BLOOD NDL HOLDER 364880

## (undated) DEVICE — SU VICRYL 4-0 PS-2 18" UND J496H

## (undated) DEVICE — STOCKING SLEEVE VASOPRESS COMPRESSION CALF MED VP501M

## (undated) DEVICE — GLOVE PROTEXIS POWDER FREE 6.0 ORTHOPEDIC 2D73ET60

## (undated) DEVICE — GLOVE PROTEXIS W/NEU-THERA 7.0  2D73TE70

## (undated) DEVICE — SU PDS II 0 CT 36" Z358T

## (undated) DEVICE — LINEN BABY BLANKET 5434

## (undated) DEVICE — PREP CHLORAPREP 26ML TINTED ORANGE  260815

## (undated) DEVICE — BAG CLEAR TRASH 1.3M 39X33" P4040C

## (undated) DEVICE — LINEN TOWEL PACK X10 5473

## (undated) DEVICE — GLOVE PROTEXIS BLUE W/NEU-THERA 6.5  2D73EB65

## (undated) DEVICE — LINEN FULL SHEET 5511

## (undated) DEVICE — LINEN HALF SHEET 5512

## (undated) RX ORDER — MORPHINE SULFATE 1 MG/ML
INJECTION, SOLUTION EPIDURAL; INTRATHECAL; INTRAVENOUS
Status: DISPENSED
Start: 2018-08-10

## (undated) RX ORDER — FENTANYL CITRATE 50 UG/ML
INJECTION, SOLUTION INTRAMUSCULAR; INTRAVENOUS
Status: DISPENSED
Start: 2018-08-10

## (undated) RX ORDER — OXYTOCIN/0.9 % SODIUM CHLORIDE 30/500 ML
PLASTIC BAG, INJECTION (ML) INTRAVENOUS
Status: DISPENSED
Start: 2018-08-10